# Patient Record
Sex: FEMALE | Race: WHITE
[De-identification: names, ages, dates, MRNs, and addresses within clinical notes are randomized per-mention and may not be internally consistent; named-entity substitution may affect disease eponyms.]

---

## 2017-03-17 ENCOUNTER — HOSPITAL ENCOUNTER (EMERGENCY)
Dept: HOSPITAL 10 - E/R | Age: 57
Discharge: LEFT BEFORE DISCHARGE | End: 2017-03-17
Payer: SELF-PAY

## 2017-03-17 VITALS — BODY MASS INDEX: 20.87 KG/M2 | WEIGHT: 90.17 LBS | HEIGHT: 55 IN

## 2017-03-17 DIAGNOSIS — Z53.21: Primary | ICD-10-CM

## 2017-07-09 ENCOUNTER — HOSPITAL ENCOUNTER (EMERGENCY)
Age: 57
Discharge: HOME | End: 2017-07-09

## 2017-07-09 DIAGNOSIS — R30.0: Primary | ICD-10-CM

## 2017-07-09 PROCEDURE — 81003 URINALYSIS AUTO W/O SCOPE: CPT

## 2017-07-12 ENCOUNTER — HOSPITAL ENCOUNTER (EMERGENCY)
Dept: HOSPITAL 10 - FTE | Age: 57
Discharge: HOME | End: 2017-07-12
Payer: COMMERCIAL

## 2017-07-12 VITALS — DIASTOLIC BLOOD PRESSURE: 64 MMHG | SYSTOLIC BLOOD PRESSURE: 118 MMHG | RESPIRATION RATE: 19 BRPM | HEART RATE: 75 BPM

## 2017-07-12 VITALS
WEIGHT: 94.8 LBS | BODY MASS INDEX: 21.94 KG/M2 | HEIGHT: 55 IN | HEIGHT: 55 IN | WEIGHT: 94.8 LBS | BODY MASS INDEX: 21.94 KG/M2

## 2017-07-12 DIAGNOSIS — N30.01: Primary | ICD-10-CM

## 2017-07-12 DIAGNOSIS — B37.3: ICD-10-CM

## 2017-07-12 PROCEDURE — 76856 US EXAM PELVIC COMPLETE: CPT

## 2017-07-12 PROCEDURE — 87086 URINE CULTURE/COLONY COUNT: CPT

## 2017-07-12 PROCEDURE — 96372 THER/PROPH/DIAG INJ SC/IM: CPT

## 2017-07-12 PROCEDURE — 76830 TRANSVAGINAL US NON-OB: CPT

## 2017-07-12 PROCEDURE — 81003 URINALYSIS AUTO W/O SCOPE: CPT

## 2017-07-12 NOTE — RADRPT
PROCEDURE:   Pelvic ultrasound.

 

CLINICAL INDICATION:  Pelvic pain

 

TECHNIQUE:   Gray scale, color doppler, spectral doppler ultrasound of the pelvis was performed with
 transabdominal and transvaginal transducers.

 

COMPARISON:   CT abdomen pelvis 02/11/2014 

 

FINDINGS:

 

Uterus: 

Position: Retroverted

Normal myometrial echogenicity.

Normal appearance of the endometrium. 

 

Ovaries: Not identified by the sonographer

 

Free fluid:  None.

 

Measurements:

Endometrium: 0.37 cm

Uterus: 6.3 x 2.2 x 3.4 cm

 

 

IMPRESSION:

 

Normal appearance of the uterus and endometrium.

Ovaries not identified by the sonographer.     

 

 

RPTAT: AADD

_____________________________________________ 

.Partha Thomas MD, MD           Date    Time 

Electronically viewed and signed by .Partha Thomas MD, MD on 07/12/2017 09:30 

 

D:  07/12/2017 09:30  T:  07/12/2017 09:30

.B/

## 2017-07-12 NOTE — ERD
ER Documentation


Chief Complaint


Date/Time


DATE: 7/12/17 


TIME: 10:15


Chief Complaint


PELVIC PAIN , PAINFUL URINATION





HPI


57-year-old female complaining of vaginal pain, pelvic pain and dysuria times 

several days.  Patient was seen here on 7/8/2017, was diagnosed with UTI.  She 

was given Keflex prescription.  Patient stated that she felt nausea and 

vomiting after taking the Keflex.  She stopped taking them.  She vomited twice 

this morning.  Able to maintain fluid intake.  Patient reports vaginal burning 

but no itching.  Denies vaginal discharge.  Denies fever or chills.  Denies 

flank pain.





ROS


All systems reviewed and are negative except as per history of present illness.





Medications


Home Meds


Active Scripts


Nitrofurantoin Monohyd Macrocr* (Macrobid*) 100 Mg Capsr, 100 MG PO BID for 7 

Days, CAP


   Prov:RUSH MCNAMARA NP         7/12/17


Acetaminophen* (Tylophen*) 500 Mg Capsule, 1 CAP PO Q6H Y for PAIN AND OR 

ELEVATED TEMP, #20 CAP


   Prov:SUSAN PLUMMER PA-C         7/9/17


Cephalexin* (Keflex*) 500 Mg Capsule, 500 MG PO BID for 7 Days, CAP


   Prov:SUSAN PLUMMER-C         7/9/17


Reported Medications


Famotidine* (Acid Reducer*) 20 Mg Tablet


   4/14/13





Allergies


Allergies:  


Coded Allergies:  


     ciprofloxacin (Verified  Allergy, Intermediate, rashes, 1/27/13)


     morphine (Verified  Adverse Reaction, DIARRHEA,VOMITING, 1/27/13)





PMhx/Soc


History of Surgery:  Yes (APPENDECTOMY)


Anesthesia Reaction:  No


Hx Neurological Disorder:  No


Hx Respiratory Disorders:  No


Hx Cardiac Disorders:  No


Hx Psychiatric Problems:  No


Hx Miscellaneous Medical Probl:  No


Hx Alcohol Use:  No


Hx Substance Use:  No


Hx Tobacco Use:  No





Physical Exam


Vitals





Vital Signs








  Date Time  Temp Pulse Resp B/P Pulse Ox O2 Delivery O2 Flow Rate FiO2


 


7/12/17 08:12 98.8 86 18 120/60 99   








Physical Exam


General:    Well-developed, well-nourished, conscious and coherent, in no 

distress


Skin:    Warm and dry without rash, good texture and turgor


Head:    Normocephalic without evidence of trauma


Eyes:    Sclera and conjunctivae normal; pupils equal, round, and reactive to 

light; extraocular movements are intact


Neck:    Supple without meningismus or adenopathy.  Carotids are equal.  

Trachea midline.  No bruits or JVD


Chest:    Normal AP diameter.  Good expansion without retractions.  Nontender.  

Lungs are clear to auscultate bilaterally with good tidal volume


Heart:    Regular rate and rhythm.  No murmur, rub, or gallops heard


Abdomen:    Soft and nontender without masses, guarding, or rebound.  Bowel 

sounds are active.  No hepatosplenomegaly


Back:    Without spinal or CVA tenderness


Pelvis:     Suprapubic tenderness with mild left pelvic tenderness.


:   External genitalia without lesions or masses.  Large amount of thick, 

white discharge noted in the vaginal canal.  Cervix normal, no cervical motion 

tenderness noted.  Uterus nontender and normal size.  No adnexal tenderness or 

masses noted.


Extremities:    Full range of motion.  Good strength bilaterally.  No clubbing, 

cyanosis, or edema. Peripheral pulses are intact. Sensation intact


Neuro:    Alert and oriented 4, GCS 15.  Cranial nerves grossly intact.  Motor 

and sensory exams nonfocal.  Moves all extremities.  Speech clear.  Gait normal


Results 24 hrs





 Laboratory Tests








Test


  7/12/17


08:50


 


Bedside Urine pH (LAB) 7.0 


 


Bedside Urine Protein (LAB) Negative 


 


Bedside Urine Glucose (UA) Negative 


 


Bedside Urine Ketones (LAB) Negative 


 


Bedside Urine Blood Negative 


 


Bedside Urine Nitrite (LAB) Negative 


 


Bedside Urine Leukocyte


Esterase (L 2+ 


 








 Current Medications








 Medications


  (Trade)  Dose


 Ordered  Sig/Hair


 Route


 PRN Reason  Start Time


 Stop Time Status Last Admin


Dose Admin


 


 Ceftriaxone Sodium


  (Rocephin)  500 mg  ONCE  ONCE


 IM


   7/12/17 09:00


 7/12/17 09:01 DC 7/12/17 08:57


 


 


 Lidocaine


  (Xylocaine 1%


  (Mdv) 20 ml)  1 ml  ONCE  ONCE


 IM


   7/12/17 09:00


 7/12/17 09:01 DC 7/12/17 08:59


 


 


 Fluconazole


  (Diflucan)  150 mg  ONCE  ONCE


 PO


   7/12/17 10:00


 7/12/17 10:01 DC  


 





PROCEDURE:   Pelvic ultrasound.


 


CLINICAL INDICATION:  Pelvic pain


 


TECHNIQUE:   Gray scale, color doppler, spectral doppler ultrasound of the 

pelvis was performed with transabdominal and transvaginal transducers.


 


COMPARISON:   CT abdomen pelvis 02/11/2014 


 


FINDINGS:


 


Uterus: 


Position: Retroverted


Normal myometrial echogenicity.


Normal appearance of the endometrium. 


 


Ovaries: Not identified by the sonographer


 


Free fluid:  None.


 


Measurements:


Endometrium: 0.37 cm


Uterus: 6.3 x 2.2 x 3.4 cm


 


 


IMPRESSION:


 


Normal appearance of the uterus and endometrium.


Ovaries not identified by the sonographer.     


 


 


RPTAT: AADD


_____________________________________________ 


.Partha Thomas MD, MD           Date    Time 


Electronically viewed and signed by .Partha Thomas MD, MD on 07/12/2017 

09:30 


 


D:  07/12/2017 09:30  T:  07/12/2017 09:30


.B/





CC: RUSH MCNAMARA. NP





Procedures/MDM


Well-appearing 57-year-old female presented ED with UTI symptoms.  Patient did 

not take the prescribed antibiotics.  Urine dip today again is positive for 

leukocyte.  Urine sent out for culture and sensitivity.  She is afebrile, no 

CVA tenderness.  I doubt she has pyelonephritis.  Rocephin 500 mg IM given to 

the patient in the ED.  Patient also will be given prescription of Macrobid.





Pelvic exam showed large amount of thick white vaginal discharge.  Patient also 

reports vaginal pain and burning.  I suspect that she has yeast vaginitis.  

Diflucan 150 mg p.o. given to the patient in the ED. Pelvic ultrasound is 

unremarkable.  I doubt pelvic inflammatory disease, ovarian torsion, ruptured 

ovarian cyst, ectopic pregnancy.





Patient appears well, stable for discharge and outpatient management. Medical 

decision making shared with patient and family. Education provided to patient 

and family. Patient and family expressed understanding of the plan.





Medications on discharge: Macrobid.


Follow-up: Primary care provider in 2-3 days or return to ED if worse.





Departure


Diagnosis:  


 Primary Impression:  


 UTI (urinary tract infection)


 Urinary tract infection type:  acute cystitis  Hematuria presence:  with 

hematuria  Qualified Code:  N30.01 - Acute cystitis with hematuria


 Additional Impression:  


 Yeast infection of the vagina


Condition:  Good


Patient Instructions:  Understanding Urinary Tract Infections (UTIs), Vaginal 

Infection: Yeast (Candidiasis)





Additional Instructions:  


Llame al doctor MAANA y linette nickolas ALANNAH PARA DENTRO DE 2-3 KENYON.Dgale a la 

secretaria que nosotros le instruimos hacer esta alannah.Avise o llame si jeronimo 

condicin se empeora antes de la alannah. Regresa aqui si peor o no mejor.











RUSH MCNAMARA. SANDRA Jul 12, 2017 10:23

## 2017-10-05 ENCOUNTER — HOSPITAL ENCOUNTER (EMERGENCY)
Dept: HOSPITAL 10 - FTE | Age: 57
Discharge: HOME | End: 2017-10-05
Payer: COMMERCIAL

## 2017-10-05 VITALS
HEIGHT: 57 IN | WEIGHT: 97 LBS | BODY MASS INDEX: 20.93 KG/M2 | WEIGHT: 97 LBS | BODY MASS INDEX: 20.93 KG/M2 | HEIGHT: 57 IN

## 2017-10-05 DIAGNOSIS — M79.89: Primary | ICD-10-CM

## 2017-10-05 PROCEDURE — 73130 X-RAY EXAM OF HAND: CPT

## 2017-10-05 NOTE — ERD
ER Documentation


Chief Complaint


Date/Time


DATE: 10/5/17 


TIME: 08:42


Chief Complaint


r. 1st finger pain/swelling x yesterday





HPI


This is a 57-year-old female who presents to the emergency room for evaluation 

of right thumb pain and swelling for the past day.  The patient states that she 

denies any trauma and denies any fevers associated with this.  She denies 

having any medical problems and has not taken any medication to help with her 

pain and symptoms and came to the emergency room for further evaluation.  The 

patient denies any numbness or tingling to the area





ROS


All systems reviewed and are negative except as per history of present illness.





Medications


Home Meds


Active Scripts


Phenazopyridine Hcl* (Pyridium*) 200 Mg Tab, 200 MG PO TID Y for URINARY PAIN, #

6 TAB


   Prov:ROSITA HENDRICKSON PA-C         6/20/16


Nitrofurantoin Monohyd Macrocr* (Macrobid*) 100 Mg Capsr, 100 MG PO BID for 7 

Days, CAP


   Prov:ROSITA HENDRICKSON PA-C         6/20/16


Cyclobenzaprine Hcl* (Cyclobenzaprine Hcl*) 10 Mg Tablet, 10 MG PO TID, #15 TAB


   Prov:RUSH MCNAMARA. NP         9/18/15


Ibuprofen* (Motrin*) 600 Mg Tab, 600 MG PO Q6H Y for PAIN AND OR ELEVATED TEMP, 

#30


   Prov:RUSH MCNAMARA. NP         9/18/15





Allergies


Allergies:  


Coded Allergies:  


     ciprofloxacin (Verified  Allergy, Unknown, 10/5/17)


     morphine (Verified  Allergy, Unknown, 10/5/17)





PMhx/Soc


Medical and Surgical Hx:  pt denies Surgical Hx


History of Surgery:  Yes (cholecystectomy )


Anesthesia Reaction:  No


Hx Neurological Disorder:  No


Hx Respiratory Disorders:  No


Hx Cardiac Disorders:  No


Hx Psychiatric Problems:  No


Hx Miscellaneous Medical Probl:  No


Hx Alcohol Use:  No


Hx Substance Use:  No


Hx Tobacco Use:  No


Smoking Status:  Never smoker





Physical Exam


Vitals





Vital Signs








  Date Time  Temp Pulse Resp B/P Pulse Ox O2 Delivery O2 Flow Rate FiO2


 


10/5/17 07:46 98.6 72 18 134/62 99   








Physical Exam


INITIAL VITAL SIGNS: Reviewed by me


GENERAL:  The patient is well developed and appropriate for usual state of 

health in no apparent distress


HEENT: Pupils equal, round, and reactive to light.  EOMI. There is no scleral 

icterus.


NECK:  C-spine is soft and supple, there is no meningismus.  There is no 

cervical lymphadenopathy.


LUNGS:  Clear to auscultation bilaterally. There are no rales, wheezes or 

rhonchi.


HEART:  Regular rate and rhythm, no murmurs, clicks, rubs or gallops.


ABDOMEN:  Soft, non-tender, non-distended.  There are bowel sounds in all four 

quadrants. No rebound or guarding.


EXTREMITIES:  There is no peripheral cyanosis or edema.  No focal swelling or 

erythema.  No swelling noted, no erythema, no skin sloughing


NEUROLOGICAL:  The patient moves all four extremities with 5/5 strength.  

Cranial nerves II - XII are intact. Normal gait. Alert and oriented


SKIN:  There is no apparent rash or petechiae.


HEME/LYMPHATIC:  There is no evidence of excessive bruising or lymphedema.


PSYCHIATRIC:  The patient does not appear anxious or depressed.


Results 24 hrs





 Current Medications








 Medications


  (Trade)  Dose


 Ordered  Sig/Hair


 Route


 PRN Reason  Start Time


 Stop Time Status Last Admin


Dose Admin


 


 Ibuprofen


  (Motrin)  800 mg  ONCE  ONCE


 PO


   10/5/17 08:30


 10/5/17 08:31 DC 10/5/17 08:06


 











Procedures/MDM


X-ray Hand 3V interpreted by me: 


Scaphoid:   [Normal]


Bones:    Degenerative changes


Joints:       [No dislocation]


Foreign body:    [None]





This 57-year-old female presents to the emergency room for evaluation of pain 

in her right thumb.  When I evaluated this patient did not note any swelling or 

erythema.  She did have what appeared to be arthritic nodules in the 

interphalangeal joints.  There was no fever and x-ray does reveal degenerative 

changes.  The patient was given Motrin in the emergency room and will be 

discharged home with a prescription for Motrin to take over the course of the 

next 7 days.  This is likely inflammatory related and not infectious that she 

has no erythema





Departure


Diagnosis:  


 Primary Impression:  


 Soft tissue swelling


Condition:  Stable











ZAID SCHULTZ DO Oct 5, 2017 08:43

## 2017-10-05 NOTE — RADRPT
PROCEDURE:   XR Hand. 

 

CLINICAL INDICATION:   Right hand pain. 

 

TECHNIQUE:   Three views.  Frontal, lateral, and oblique images of the right hand were obtained. 

 

COMPARISON:   No prior studies are available for comparison. 

 

FINDINGS:

There is no fracture or dislocation.

The soft tissues are normal.

There are degenerative changes of the first interphalangeal joint with joint space narrowing and ost
eophytes. The articular surfaces are otherwise intact.

There is no lytic or blastic lesion.

There is no radiopaque foreign body. 

 

IMPRESSION:

1.  Degenerative changes of the first interphalangeal joint.

2.  Otherwise unremarkable images of the right hand. 

RPTAT: QQ

_____________________________________________ 

.Broderick Lee MD, MD           Date    Time 

Electronically viewed and signed by .Broderick Lee MD, MD on 10/05/2017 08:39 

 

D:  10/05/2017 08:39  T:  10/05/2017 08:39

.R/

## 2017-12-18 ENCOUNTER — HOSPITAL ENCOUNTER (EMERGENCY)
Dept: HOSPITAL 10 - FTE | Age: 57
Discharge: HOME | End: 2017-12-18
Payer: COMMERCIAL

## 2017-12-18 VITALS
HEIGHT: 60 IN | BODY MASS INDEX: 19.04 KG/M2 | WEIGHT: 97 LBS | BODY MASS INDEX: 19.04 KG/M2 | HEIGHT: 60 IN | WEIGHT: 97 LBS

## 2017-12-18 DIAGNOSIS — N39.0: Primary | ICD-10-CM

## 2017-12-18 LAB
ADD UMIC: YES
COLOR UR: YELLOW
GLUCOSE UR STRIP-MCNC: NEGATIVE MG/DL
KETONES UR STRIP.AUTO-MCNC: NEGATIVE MG/DL
NITRITE UR QL STRIP.AUTO: NEGATIVE MG/DL
RBC # UR AUTO: NEGATIVE MG/DL
UR ASCORBIC ACID: 40 MG/DL
UR BILIRUBIN (DIP): NEGATIVE MG/DL
UR CLARITY: CLEAR
UR PH (DIP): 7 (ref 5–9)
UR RBC: 0 /HPF (ref 0–5)
UR SPECIFIC GRAVITY (DIP): 1.02 (ref 1–1.03)
UR TOTAL PROTEIN (DIP): NEGATIVE MG/DL
UROBILINOGEN UR STRIP-ACNC: NEGATIVE MG/DL
WBC # UR STRIP: (no result) LEU/UL

## 2017-12-18 PROCEDURE — 81001 URINALYSIS AUTO W/SCOPE: CPT

## 2017-12-18 PROCEDURE — 99283 EMERGENCY DEPT VISIT LOW MDM: CPT

## 2017-12-18 NOTE — ERD
ER Documentation


Chief Complaint


Chief Complaint


PT with dysuria and Vaginal pain since this morning.





HPI


49-year-old female comes in with painful urination with associated burning or 

urgency frequency starting today.  He reports suprapubic pelvic pressure pain, 

but no vaginal bleeding, fevers, chills, nausea, vomiting or flank pain.  She 

denies hematuria.





ROS


All systems reviewed and are negative except as per history of present illness.





Medications


Home Meds


Active Scripts


Ibuprofen* (Motrin*) 600 Mg Tab, 600 MG PO Q6, #30 TAB


   Prov:CAROLYNN DEAN PA-C         12/18/17


Cephalexin* (Keflex*) 500 Mg Capsule, 500 MG PO TID for 7 Days, CAP


   Prov:CAROLYNN DEAN PA-C         12/18/17


Ibuprofen* (Motrin*) 800 Mg Tab, 800 MG PO Q6H Y for PAIN AND OR ELEVATED TEMP, 

#30 TAB


   Prov:ZAID SCHULTZ DO         10/5/17


Nitrofurantoin Monohyd Macrocr* (Macrobid*) 100 Mg Capsr, 100 MG PO BID for 7 

Days, CAP


   Prov:RUSH MCNAMARA NP         7/12/17


Acetaminophen* (Tylophen*) 500 Mg Capsule, 1 CAP PO Q6H Y for PAIN AND OR 

ELEVATED TEMP, #20 CAP


   Prov:SUSAN PLUMMER PA-C         7/9/17


Cephalexin* (Keflex*) 500 Mg Capsule, 500 MG PO BID for 7 Days, CAP


   Prov:SUSAN PLUMMER PA-C         7/9/17


Phenazopyridine Hcl* (Pyridium*) 200 Mg Tab, 200 MG PO TID Y for URINARY PAIN, #

6 TAB


   Prov:ROSITA HENDRICKSON PA-C         6/20/16


Nitrofurantoin Monohyd Macrocr* (Macrobid*) 100 Mg Capsr, 100 MG PO BID for 7 

Days, CAP


   Prov:ROSITA HENDRICKSON PA-C         6/20/16


Cyclobenzaprine Hcl* (Cyclobenzaprine Hcl*) 10 Mg Tablet, 10 MG PO TID, #15 TAB


   Prov:RUSH MCNAMARA NP         9/18/15


Ibuprofen* (Motrin*) 600 Mg Tab, 600 MG PO Q6H Y for PAIN AND OR ELEVATED TEMP, 

#30


   Prov:RUSH MCNAMARA NP         9/18/15


Reported Medications


Famotidine* (Acid Reducer*) 20 Mg Tablet


   4/14/13





Allergies


Allergies:  


Coded Allergies:  


     ciprofloxacin (Verified  Allergy, Intermediate, rashes, 1/27/13)


     morphine (Verified  Adverse Reaction, DIARRHEA,VOMITING, 1/27/13)





PMhx/Soc


History of Surgery:  Yes (cholecystectomy )


Anesthesia Reaction:  No


Hx Neurological Disorder:  No


Hx Respiratory Disorders:  No


Hx Cardiac Disorders:  No


Hx Psychiatric Problems:  No


Hx Miscellaneous Medical Probl:  No


Hx Alcohol Use:  No


Hx Substance Use:  No


Hx Tobacco Use:  No


Smoking Status:  Never smoker





Physical Exam


Vitals





Vital Signs








  Date Time  Temp Pulse Resp B/P Pulse Ox O2 Delivery O2 Flow Rate FiO2


 


12/18/17 11:43 98.3 104 18 134/69 97   








Physical Exam


General: Well-developed, well-nourished.  The patient appears in no acute 

distress.


HEENT: Head is normocephalic, atraumatic. No scleral icterus.  


Neck: Supple.  Nontender.


Lungs: Clear to auscultation.  Normal air movement.


Heart: Regular rate and rhythm.  S1 and S2 are normal.  No murmurs, gallops, or 

rubs.


Abdomen: Soft, nontender, nondistended.  Bowel sounds are normoactive.


Extremities: No clubbing or cyanosis.  Normal pulses. Moving extremities x 4. 

No weakness.


Neurologic: Alert and oriented 3.  No focal deficits.


Skin: Normal turgor.  No rash or lesions.


Results 24 hrs





 Laboratory Tests








Test


  12/18/17


14:07


 


Urine Color YELLOW 


 


Urine Clarity CLEAR 


 


Urine pH 7.0 


 


Urine Specific Gravity 1.016 


 


Urine Ketones NEGATIVEmg/dL 


 


Urine Nitrite NEGATIVEmg/dL 


 


Urine Bilirubin NEGATIVEmg/dL 


 


Urine Urobilinogen NEGATIVEmg/dL 


 


Urine Leukocyte Esterase 2+Shanique/ul 


 


Urine Microscopic RBC 0/HPF 


 


Urine Microscopic /HPF 


 


Urine Hemoglobin NEGATIVEmg/dL 


 


Urine Glucose NEGATIVEmg/dL 


 


Urine Total Protein NEGATIVEmg/dl 











Procedures/MDM


57-year-old female comes in with urinary tract infection, urine analysis shows 

about 100 white blood cells positive leukocyte esterase.  Her vitals reviewed, 

she is not showing signs of sepsis, pyelonephritis, acute or surgical abdominal 

process.





Departure


Diagnosis:  


 Primary Impression:  


 UTI (urinary tract infection)


Condition:  Good


Patient Instructions:  Understanding Urinary Tract Infections (UTIs)











CAROLYNN DEAN PA-C Dec 18, 2017 14:37

## 2018-01-16 ENCOUNTER — HOSPITAL ENCOUNTER (EMERGENCY)
Age: 58
Discharge: LEFT BEFORE DISCHARGE | End: 2018-01-16

## 2018-01-16 ENCOUNTER — HOSPITAL ENCOUNTER (EMERGENCY)
Dept: HOSPITAL 91 - E/R | Age: 58
Discharge: LEFT BEFORE DISCHARGE | End: 2018-01-16
Payer: SELF-PAY

## 2018-01-16 DIAGNOSIS — Z53.21: Primary | ICD-10-CM

## 2018-07-10 ENCOUNTER — HOSPITAL ENCOUNTER (EMERGENCY)
Age: 58
Discharge: HOME | End: 2018-07-10

## 2018-07-10 ENCOUNTER — HOSPITAL ENCOUNTER (EMERGENCY)
Dept: HOSPITAL 91 - FTE | Age: 58
Discharge: HOME | End: 2018-07-10
Payer: COMMERCIAL

## 2018-07-10 DIAGNOSIS — E87.6: Primary | ICD-10-CM

## 2018-07-10 DIAGNOSIS — R19.7: ICD-10-CM

## 2018-07-10 DIAGNOSIS — J32.9: ICD-10-CM

## 2018-07-10 DIAGNOSIS — N39.0: ICD-10-CM

## 2018-07-10 LAB
ADD MAN DIFF?: NO
ADD UMIC: YES
ALANINE AMINOTRANSFERASE: 13 IU/L (ref 13–69)
ALBUMIN/GLOBULIN RATIO: 1.4
ALBUMIN: 4.5 G/DL (ref 3.3–4.9)
ALKALINE PHOSPHATASE: 97 IU/L (ref 42–121)
AMYLASE: 69 U/L (ref 11–123)
ANION GAP: 11 (ref 8–16)
ASPARTATE AMINO TRANSFERASE: 24 IU/L (ref 15–46)
BASOPHIL #: 0 10^3/UL (ref 0–0.1)
BASOPHILS %: 1 % (ref 0–2)
BILIRUBIN,DIRECT: 0 MG/DL (ref 0–0.2)
BILIRUBIN,TOTAL: 0.5 MG/DL (ref 0.2–1.3)
BLOOD UREA NITROGEN: 6 MG/DL (ref 7–20)
CALCIUM: 9.8 MG/DL (ref 8.4–10.2)
CARBON DIOXIDE: 27 MMOL/L (ref 21–31)
CHLORIDE: 108 MMOL/L (ref 97–110)
CREATININE: 0.72 MG/DL (ref 0.44–1)
EOSINOPHILS #: 0 10^3/UL (ref 0–0.5)
EOSINOPHILS %: 0.3 % (ref 0–7)
GLOBULIN: 3.2 G/DL (ref 1.3–3.2)
GLUCOSE: 129 MG/DL (ref 70–220)
HEMATOCRIT: 38.9 % (ref 37–47)
HEMOGLOBIN: 13.1 G/DL (ref 12–16)
LIPASE: 150 U/L (ref 23–300)
LYMPHOCYTES #: 1 10^3/UL (ref 0.8–2.9)
LYMPHOCYTES %: 26 % (ref 15–51)
MEAN CORPUSCULAR HEMOGLOBIN: 30.5 PG (ref 29–33)
MEAN CORPUSCULAR HGB CONC: 33.7 G/DL (ref 32–37)
MEAN CORPUSCULAR VOLUME: 90.5 FL (ref 82–101)
MEAN PLATELET VOLUME: 9.6 FL (ref 7.4–10.4)
MONOCYTE #: 0.3 10^3/UL (ref 0.3–0.9)
MONOCYTES %: 7.1 % (ref 0–11)
NEUTROPHIL #: 2.6 10^3/UL (ref 1.6–7.5)
NEUTROPHILS %: 65.3 % (ref 39–77)
NUCLEATED RED BLOOD CELLS #: 0 10^3/UL (ref 0–0)
NUCLEATED RED BLOOD CELLS%: 0 /100WBC (ref 0–0)
PLATELET COUNT: 301 10^3/UL (ref 140–415)
POTASSIUM: 3.2 MMOL/L (ref 3.5–5.1)
RED BLOOD COUNT: 4.3 10^6/UL (ref 4.2–5.4)
RED CELL DISTRIBUTION WIDTH: 13 % (ref 11.5–14.5)
SODIUM: 143 MMOL/L (ref 135–144)
TOTAL PROTEIN: 7.7 G/DL (ref 6.1–8.1)
UR ASCORBIC ACID: NEGATIVE MG/DL
UR BACTERIA: (no result) /HPF
UR BILIRUBIN (DIP): NEGATIVE MG/DL
UR BLOOD (DIP): NEGATIVE MG/DL
UR CLARITY: (no result)
UR COLOR: YELLOW
UR GLUCOSE (DIP): NEGATIVE MG/DL
UR KETONES (DIP): NEGATIVE MG/DL
UR LEUKOCYTE ESTERASE (DIP): (no result) LEU/UL
UR NITRITE (DIP): NEGATIVE MG/DL
UR PH (DIP): 6 (ref 5–9)
UR RBC: 4 /HPF (ref 0–5)
UR SPECIFIC GRAVITY (DIP): 1 (ref 1–1.03)
UR TOTAL PROTEIN (DIP): NEGATIVE MG/DL
UR UROBILINOGEN (DIP): NEGATIVE MG/DL
UR WBC: 3 /HPF (ref 0–5)
WHITE BLOOD COUNT: 3.9 10^3/UL (ref 4.8–10.8)

## 2018-07-10 PROCEDURE — 85025 COMPLETE CBC W/AUTO DIFF WBC: CPT

## 2018-07-10 PROCEDURE — 80053 COMPREHEN METABOLIC PANEL: CPT

## 2018-07-10 PROCEDURE — 87086 URINE CULTURE/COLONY COUNT: CPT

## 2018-07-10 PROCEDURE — 82150 ASSAY OF AMYLASE: CPT

## 2018-07-10 PROCEDURE — 99283 EMERGENCY DEPT VISIT LOW MDM: CPT

## 2018-07-10 PROCEDURE — 82962 GLUCOSE BLOOD TEST: CPT

## 2018-07-10 PROCEDURE — 81001 URINALYSIS AUTO W/SCOPE: CPT

## 2018-07-10 PROCEDURE — 83690 ASSAY OF LIPASE: CPT

## 2018-07-10 RX ADMIN — POTASSIUM CHLORIDE 1 MEQ: 1500 TABLET, EXTENDED RELEASE ORAL at 11:50

## 2018-12-10 ENCOUNTER — HOSPITAL ENCOUNTER (EMERGENCY)
Age: 58
Discharge: HOME | End: 2018-12-10

## 2018-12-10 ENCOUNTER — HOSPITAL ENCOUNTER (EMERGENCY)
Dept: HOSPITAL 91 - E/R | Age: 58
Discharge: HOME | End: 2018-12-10
Payer: COMMERCIAL

## 2018-12-10 DIAGNOSIS — N30.90: Primary | ICD-10-CM

## 2018-12-10 LAB
URINE LEUKOCYTE EST (DIP) POC: (no result)
URINE PH (DIP) POC: 6 (ref 5–8.5)

## 2018-12-10 PROCEDURE — 99283 EMERGENCY DEPT VISIT LOW MDM: CPT

## 2018-12-10 PROCEDURE — 81003 URINALYSIS AUTO W/O SCOPE: CPT

## 2018-12-10 RX ADMIN — ALUMINUM HYDROXIDE, MAGNESIUM HYDROXIDE, DIMETHICONE 1 ML: 200; 200; 20 SUSPENSION ORAL at 08:09

## 2018-12-10 RX ADMIN — ONDANSETRON 1 MG: 4 TABLET, ORALLY DISINTEGRATING ORAL at 08:09

## 2018-12-10 RX ADMIN — NITROFURANTOIN MONOHYDRATE/MACROCRYSTALLINE 1 MG: 25; 75 CAPSULE ORAL at 08:10

## 2018-12-10 RX ADMIN — LOPERAMIDE HYDROCHLORIDE 1 MG: 1 SOLUTION ORAL at 08:26

## 2018-12-14 ENCOUNTER — HOSPITAL ENCOUNTER (EMERGENCY)
Dept: HOSPITAL 91 - E/R | Age: 58
Discharge: HOME | End: 2018-12-14
Payer: COMMERCIAL

## 2018-12-14 ENCOUNTER — HOSPITAL ENCOUNTER (EMERGENCY)
Age: 58
Discharge: HOME | End: 2018-12-14

## 2018-12-14 DIAGNOSIS — N30.90: Primary | ICD-10-CM

## 2018-12-14 LAB
ADD MAN DIFF?: NO
ADD UMIC: YES
ALANINE AMINOTRANSFERASE: 46 IU/L (ref 13–69)
ALBUMIN/GLOBULIN RATIO: 1.36
ALBUMIN: 4.1 G/DL (ref 3.3–4.9)
ALKALINE PHOSPHATASE: 88 IU/L (ref 42–121)
ANION GAP: 8 (ref 5–13)
ASPARTATE AMINO TRANSFERASE: 97 IU/L (ref 15–46)
BASOPHIL #: 0.1 10^3/UL (ref 0–0.1)
BASOPHILS %: 0.8 % (ref 0–2)
BILIRUBIN,DIRECT: 0 MG/DL (ref 0–0.2)
BILIRUBIN,TOTAL: 0.6 MG/DL (ref 0.2–1.3)
BLOOD UREA NITROGEN: 7 MG/DL (ref 7–20)
CALCIUM: 9.5 MG/DL (ref 8.4–10.2)
CARBON DIOXIDE: 29 MMOL/L (ref 21–31)
CHLORIDE: 104 MMOL/L (ref 97–110)
CREATININE: 0.68 MG/DL (ref 0.44–1)
EOSINOPHILS #: 0 10^3/UL (ref 0–0.5)
EOSINOPHILS %: 0.6 % (ref 0–7)
GLOBULIN: 3 G/DL (ref 1.3–3.2)
GLUCOSE: 108 MG/DL (ref 70–220)
HEMATOCRIT: 37.7 % (ref 37–47)
HEMOGLOBIN: 12.4 G/DL (ref 12–16)
IMMATURE GRANS #M: 0.01 10^3/UL (ref 0–0.03)
IMMATURE GRANS % (M): 0.2 % (ref 0–0.43)
LIPASE: 57 U/L (ref 23–300)
LYMPHOCYTES #: 2 10^3/UL (ref 0.8–2.9)
LYMPHOCYTES %: 31.4 % (ref 15–51)
MEAN CORPUSCULAR HEMOGLOBIN: 29.9 PG (ref 29–33)
MEAN CORPUSCULAR HGB CONC: 32.9 G/DL (ref 32–37)
MEAN CORPUSCULAR VOLUME: 90.8 FL (ref 82–101)
MEAN PLATELET VOLUME: 9.8 FL (ref 7.4–10.4)
MONOCYTE #: 0.4 10^3/UL (ref 0.3–0.9)
MONOCYTES %: 6.8 % (ref 0–11)
NEUTROPHIL #: 3.9 10^3/UL (ref 1.6–7.5)
NEUTROPHILS %: 60.2 % (ref 39–77)
NUCLEATED RED BLOOD CELLS #: 0 10^3/UL (ref 0–0)
NUCLEATED RED BLOOD CELLS%: 0 /100WBC (ref 0–0)
PLATELET COUNT: 261 10^3/UL (ref 140–415)
POTASSIUM: 3.5 MMOL/L (ref 3.5–5.1)
RED BLOOD COUNT: 4.15 10^6/UL (ref 4.2–5.4)
RED CELL DISTRIBUTION WIDTH: 13 % (ref 11.5–14.5)
SODIUM: 141 MMOL/L (ref 135–144)
TOTAL PROTEIN: 7.1 G/DL (ref 6.1–8.1)
UR ASCORBIC ACID: NEGATIVE MG/DL
UR BACTERIA: (no result) /HPF
UR BILIRUBIN (DIP): NEGATIVE MG/DL
UR BLOOD (DIP): (no result) MG/DL
UR CLARITY: (no result)
UR COLOR: YELLOW
UR GLUCOSE (DIP): NEGATIVE MG/DL
UR KETONES (DIP): NEGATIVE MG/DL
UR LEUKOCYTE ESTERASE (DIP): (no result) LEU/UL
UR NITRITE (DIP): NEGATIVE MG/DL
UR PH (DIP): 7 (ref 5–9)
UR RBC: 5 /HPF (ref 0–5)
UR SPECIFIC GRAVITY (DIP): 1 (ref 1–1.03)
UR SQUAMOUS EPITHELIAL CELL: (no result) /HPF
UR TOTAL PROTEIN (DIP): NEGATIVE MG/DL
UR UROBILINOGEN (DIP): NEGATIVE MG/DL
UR WBC: 87 /HPF (ref 0–5)
WHITE BLOOD COUNT: 6.4 10^3/UL (ref 4.8–10.8)

## 2018-12-14 PROCEDURE — 96374 THER/PROPH/DIAG INJ IV PUSH: CPT

## 2018-12-14 PROCEDURE — 83690 ASSAY OF LIPASE: CPT

## 2018-12-14 PROCEDURE — 80053 COMPREHEN METABOLIC PANEL: CPT

## 2018-12-14 PROCEDURE — 85025 COMPLETE CBC W/AUTO DIFF WBC: CPT

## 2018-12-14 PROCEDURE — 99284 EMERGENCY DEPT VISIT MOD MDM: CPT

## 2018-12-14 PROCEDURE — 81001 URINALYSIS AUTO W/SCOPE: CPT

## 2018-12-14 RX ADMIN — FENTANYL CITRATE 1 MCG: 50 INJECTION, SOLUTION INTRAMUSCULAR; INTRAVENOUS at 17:12

## 2019-02-05 ENCOUNTER — HOSPITAL ENCOUNTER (EMERGENCY)
Dept: HOSPITAL 91 - FTE | Age: 59
Discharge: HOME | End: 2019-02-05
Payer: COMMERCIAL

## 2019-02-05 DIAGNOSIS — N39.0: Primary | ICD-10-CM

## 2019-02-05 LAB
URINE LEUKOCYTE EST (DIP) POC: (no result)
URINE PH (DIP) POC: 7 (ref 5–8.5)

## 2019-02-05 PROCEDURE — 81003 URINALYSIS AUTO W/O SCOPE: CPT

## 2019-02-05 PROCEDURE — 87086 URINE CULTURE/COLONY COUNT: CPT

## 2019-02-05 PROCEDURE — 99283 EMERGENCY DEPT VISIT LOW MDM: CPT

## 2019-04-05 ENCOUNTER — HOSPITAL ENCOUNTER (EMERGENCY)
Dept: HOSPITAL 91 - FTE | Age: 59
Discharge: HOME | End: 2019-04-05
Payer: COMMERCIAL

## 2019-04-05 ENCOUNTER — HOSPITAL ENCOUNTER (EMERGENCY)
Dept: HOSPITAL 10 - FTE | Age: 59
Discharge: HOME | End: 2019-04-05
Payer: COMMERCIAL

## 2019-04-05 VITALS — DIASTOLIC BLOOD PRESSURE: 79 MMHG | RESPIRATION RATE: 18 BRPM | SYSTOLIC BLOOD PRESSURE: 136 MMHG | HEART RATE: 61 BPM

## 2019-04-05 VITALS
WEIGHT: 84.88 LBS | HEIGHT: 55 IN | BODY MASS INDEX: 19.64 KG/M2 | HEIGHT: 55 IN | BODY MASS INDEX: 19.64 KG/M2 | WEIGHT: 84.88 LBS

## 2019-04-05 DIAGNOSIS — N39.0: Primary | ICD-10-CM

## 2019-04-05 LAB
ADD UMIC: YES
UR ASCORBIC ACID: NEGATIVE MG/DL
UR BACTERIA: (no result) /HPF
UR BILIRUBIN (DIP): NEGATIVE MG/DL
UR BLOOD (DIP): NEGATIVE MG/DL
UR CLARITY: CLEAR
UR COLOR: (no result)
UR GLUCOSE (DIP): NEGATIVE MG/DL
UR KETONES (DIP): NEGATIVE MG/DL
UR LEUKOCYTE ESTERASE (DIP): (no result) LEU/UL
UR NITRITE (DIP): NEGATIVE MG/DL
UR PH (DIP): 8 (ref 5–9)
UR RBC: 0 /HPF (ref 0–5)
UR SPECIFIC GRAVITY (DIP): 1 (ref 1–1.03)
UR TOTAL PROTEIN (DIP): NEGATIVE MG/DL
UR UROBILINOGEN (DIP): NEGATIVE MG/DL
UR WBC: 9 /HPF (ref 0–5)

## 2019-04-05 PROCEDURE — 81001 URINALYSIS AUTO W/SCOPE: CPT

## 2019-04-05 PROCEDURE — 99284 EMERGENCY DEPT VISIT MOD MDM: CPT

## 2019-04-05 RX ADMIN — ACETAMINOPHEN 1 MG: 500 TABLET, FILM COATED ORAL at 14:37

## 2019-04-05 NOTE — ERD
ER Documentation


Chief Complaint


Chief Complaint





body aches, nasal congestion and sneezing x3 days





HPI


59-year-old female presents with burning in the pelvic area for the last 16 


years.  She is seen a gynecologist and told her that was normal as well as her 


primary doctor.  She has been treated on occasion with cream and antibiotics 


without relief.  She denies any discharge, bleeding, right or left abdominal 


pain, fevers, vomiting, shortness of breath, chest pain.





ROS


All systems reviewed and are negative except as per history of present illness.





Medications


Home Meds


Active Scripts


Nitrofurantoin Monohyd Macrocr* (Macrobid*) 100 Mg Capsr, 100 MG PO BID for 7 


Days, CAP


   Prov:DURGA JACQUES MD         4/5/19


Phenazopyridine Hcl* (Pyridium*) 200 Mg Tab, 200 MG PO TID PRN for URINARY PAIN,


#6 TAB


   Prov:DURGA JACQUES MD         4/5/19


Acetaminophen* (Tylophen*) 500 Mg Capsule, 1 CAP PO Q6H PRN for PAIN AND OR 


ELEVATED TEMP, #20 CAP


   Prov:DURGA JACQUES MD         4/5/19


Cephalexin* (Keflex*) 500 Mg Capsule, 500 MG PO QID for 7 Days, CAP


   Prov:MARIO WATTS PA-C         2/5/19


Cephalexin* (Keflex*) 500 Mg Capsule, 500 MG PO BID for 7 Days, CAP


   Prov:GEORGES VILLEGAS MD         12/14/18


Ondansetron (Ondansetron Odt) 4 Mg Tab.rapdis, 4 MG PO Q6H PRN for NAUSEA AND/OR


VOMITING, #10 TAB


   Prov:DANIA OLIVERA MD         12/10/18


Loperamide Hcl* (Imodium*) 2 Mg Capsule, 2 MG PO .AFTER EA LOOSE BM PRN for 


DIARRHEA, #10 TAB


   Prov:DANIA OLIVERA MD         12/10/18


Nitrofurantoin Monohyd Macrocr* (Macrobid*) 100 Mg Capsr, 100 MG PO BID for 7 


Days, CAP


   Prov:DANIA OLIVERA MD         12/10/18


Loperamide Hcl* (Imodium*) 2 Mg Capsule, 2 MG PO .AFTER EA LOOSE BM PRN for 


DIARRHEA, #10 TAB


   Prov:RUIZ BARNARD         7/10/18


Acetaminophen* (Tylophen*) 500 Mg Capsule, 1 CAP PO Q6H PRN for PAIN AND OR 


ELEVATED TEMP, #20 CAP


   Prov:RUIZ BARNARD         7/10/18


Amoxicillin/Potassium Clav (Amox-Clav 875-125 mg Tablet) 875-125 mg Tab, 1 TAB 


PO BID for 10 Days, #20 TAB


   Prov:RUIZ BARNARD         7/10/18


Ibuprofen* (Motrin*) 600 Mg Tab, 600 MG PO Q6, #30 TAB


   Prov:CAROLYNN DEAN PA-C         12/18/17


Cephalexin* (Keflex*) 500 Mg Capsule, 500 MG PO TID for 7 Days, CAP


   Prov:CAROLYNN DEAN PA-C         12/18/17


Ibuprofen* (Motrin*) 800 Mg Tab, 800 MG PO Q6H PRN for PAIN AND OR ELEVATED 


TEMP, #30 TAB


   Prov:ZAID SCHULTZ DO         10/5/17


Nitrofurantoin Monohyd Macrocr* (Macrobid*) 100 Mg Capsr, 100 MG PO BID for 7 


Days, CAP


   Prov:RUSH MCNAMARA NP         7/12/17


Acetaminophen* (Tylophen*) 500 Mg Capsule, 1 CAP PO Q6H PRN for PAIN AND OR 


ELEVATED TEMP, #20 CAP


   Prov:SUSAN PLUMMER PA-C         7/9/17


Cephalexin* (Keflex*) 500 Mg Capsule, 500 MG PO BID for 7 Days, CAP


   Prov:SUSAN PLUMMER PA-C         7/9/17


Phenazopyridine Hcl* (Pyridium*) 200 Mg Tab, 200 MG PO TID PRN for URINARY PAIN,


#6 TAB


   Prov:ROSITA HENDRICKSON PA-C         6/20/16


Nitrofurantoin Monohyd Macrocr* (Macrobid*) 100 Mg Capsr, 100 MG PO BID for 7 


Days, CAP


   Prov:ROSITA HENDRICKSON PA-C         6/20/16


Cyclobenzaprine Hcl* (Cyclobenzaprine Hcl*) 10 Mg Tablet, 10 MG PO TID, #15 TAB


   Prov:RUSH MCNAMARA. NP         9/18/15


Ibuprofen* (Motrin*) 600 Mg Tab, 600 MG PO Q6H PRN for PAIN AND OR ELEVATED 


TEMP, #30


   Prov:RUSH MCNAMARA NP         9/18/15


Reported Medications


Famotidine* (Acid Reducer*) 20 Mg Tablet


   4/14/13





Allergies


Allergies:  


Coded Allergies:  


     ciprofloxacin (Verified  Allergy, Intermediate, rashes, 7/10/18)


     morphine (Verified  Adverse Reaction, Unknown, DIARRHEA,VOMITING, 7/10/18)





PMhx/Soc


Medical and Surgical Hx:  pt denies Medical Hx


History of Surgery:  Yes (c section,cholecystectomy)


Anesthesia Reaction:  No


Hx Neurological Disorder:  No


Hx Respiratory Disorders:  No


Hx Cardiac Disorders:  No


Hx Psychiatric Problems:  No


Hx Miscellaneous Medical Probl:  Yes (UTI)


Hx Alcohol Use:  No


Hx Substance Use:  No


Hx Tobacco Use:  No


Smoking Status:  Never smoker





FmHx


Family History:  No diabetes, No coronary disease, No other





Physical Exam


Vitals





Vital Signs


  Date      Temp  Pulse  Resp  B/P (MAP)   Pulse Ox  O2          O2 Flow    FiO2


Time                                                 Delivery    Rate


    4/5/19  98.7     61    18      136/79        98


     12:57                           (98)





Physical Exam


Const:   No acute distress


Head:   Atraumatic 


Eyes:    Normal Conjunctiva


ENT:    Normal External Ears, Nose and Mouth.


Neck:               Full range of motion. No meningismus.


Resp:   Clear to auscultation bilaterally


Cardio:   Regular rate and rhythm, no murmurs


Abd:    Soft, non tender, non distended. Normal bowel sounds.  Pelvic 


examination with chaperone shows no no masses, external lesions, erythema, 


identifiable abnormalities.  No cervical motion tenderness.


Skin:   No petechiae or rashes


Back:   No midline or flank tenderness


Ext:    No cyanosis, or edema


Neur:   Awake and alert


Psych:    Normal Mood and Affect


Results 24 hrs





Laboratory Tests


                   Test
                        4/5/19
14:10


                   Urine Color               KAUR


                   Urine Clarity             CLEAR


                   Urine pH                             8.0


                   Urine Specific Gravity             1.003


                   Urine Ketones             NEGATIVE mg/dL


                   Urine Nitrite             NEGATIVE mg/dL


                   Urine Bilirubin           NEGATIVE mg/dL


                   Urine Urobilinogen        NEGATIVE mg/dL


                   Urine Leukocyte Esterase       1+ Shanique/ul


                   Urine Microscopic RBC             0 /HPF


                   Urine Microscopic WBC             9 /HPF


                   Urine Bacteria            FEW /HPF


                   Urine Hemoglobin          NEGATIVE mg/dL


                   Urine Glucose             NEGATIVE mg/dL


                   Urine Total Protein       NEGATIVE mg/dl





Current Medications


 Medications
   Dose
          Sig/Hair
       Start Time
   Status  Last


 (Trade)       Ordered        Route
 PRN     Stop Time              Admin
Dose


                              Reason                                Admin


                500 mg         ONCE  STAT
    4/5/19        DC            4/5/19


Acetaminophen                 PO
            14:33
 4/5/19                14:37




  (Tylenol                                  14:34


Tab)








Procedures/MDM


Urine shows leukocyte esterase and white blood cells.  Patient will be treated 


for UTI with Macrobid and Tylenol and Pyridium although patient's symptoms have 


been for 16 years with normal evaluations by OB/GYN.  She will be discharged 


home with primary care follow-up and continued observation and return 


precautions.  The patient was stable with no new complaints during the ER 


course. Clinically, there is no current evidence to suggest meningitis, sepsis, 


acute abdomen, pneumonia, stroke,  acute coronary syndrome, pulmonary embolism, 


aortic dissection or any other emergent condition appearing to require further 


evaluation or hospitalization.  Patient counseled regarding my diagnostic 


impression and care plan. Prior to discharge all questions answered. Pt agrees 


with treatment plan and understands strict return precautions. Pt is instructed 


to follow up with primary care provider within 24-48 hours. Precautionary 


instructions provided including instructions to return to the ER if not impr


oving or for any worsening or changing symptoms or concerns.





Departure


Diagnosis:  


   Primary Impression:  


   UTI (urinary tract infection)


   Urinary tract infection type:  acute cystitis  Hematuria presence:  without 


   hematuria  Qualified Codes:  N30.00 - Acute cystitis without hematuria


Condition:  Stable


Patient Instructions:  Understanding Urinary Tract Infections (UTIs), Pelvic 


Pain, Unknown Cause


Referrals:  


ALEX REYES (PCP)





Additional Instructions:  


HAY INFECCION EN ORINA JJ Y VAMOS A TRATAR JJ OTRO examines normal hoy. 


Cheque otro vez con jeronimo doctor primario en el proximo nunez or regresa para mas o 


nueva simptomas.











DURGA JACQUES MD              Apr 5, 2019 14:50

## 2019-04-26 ENCOUNTER — HOSPITAL ENCOUNTER (EMERGENCY)
Dept: HOSPITAL 10 - E/R | Age: 59
Discharge: HOME | End: 2019-04-26
Payer: COMMERCIAL

## 2019-04-26 ENCOUNTER — HOSPITAL ENCOUNTER (EMERGENCY)
Dept: HOSPITAL 91 - E/R | Age: 59
Discharge: HOME | End: 2019-04-26
Payer: COMMERCIAL

## 2019-04-26 VITALS — WEIGHT: 84.44 LBS | BODY MASS INDEX: 19.54 KG/M2 | HEIGHT: 55 IN

## 2019-04-26 VITALS — HEART RATE: 63 BPM | SYSTOLIC BLOOD PRESSURE: 149 MMHG | RESPIRATION RATE: 18 BRPM | DIASTOLIC BLOOD PRESSURE: 66 MMHG

## 2019-04-26 DIAGNOSIS — R40.2362: ICD-10-CM

## 2019-04-26 DIAGNOSIS — R40.2142: ICD-10-CM

## 2019-04-26 DIAGNOSIS — N30.90: Primary | ICD-10-CM

## 2019-04-26 DIAGNOSIS — R40.2252: ICD-10-CM

## 2019-04-26 LAB
URINE LEUKOCYTE EST (DIP) POC: (no result)
URINE PH (DIP) POC: 6.5 (ref 5–8.5)

## 2019-04-26 PROCEDURE — 99284 EMERGENCY DEPT VISIT MOD MDM: CPT

## 2019-04-26 PROCEDURE — 96372 THER/PROPH/DIAG INJ SC/IM: CPT

## 2019-04-26 PROCEDURE — 81003 URINALYSIS AUTO W/O SCOPE: CPT

## 2019-04-26 RX ADMIN — CEPHALEXIN 1 MG: 500 CAPSULE ORAL at 07:04

## 2019-04-26 RX ADMIN — KETOROLAC TROMETHAMINE 1 MG: 30 INJECTION, SOLUTION INTRAMUSCULAR at 07:03

## 2019-04-26 RX ADMIN — PHENAZOPYRIDINE HYDROCHLORIDE 1 MG: 100 TABLET ORAL at 07:03

## 2019-04-26 NOTE — ERD
ER Documentation


Chief Complaint


Chief Complaint





AP, DIARRHEA X'S 1 DAY





HPI


Patient is a 59-year-old female with no medical problems who presents with 


abdominal pain.  She said her symptoms started yesterday at 6 PM.  She has had 


diarrhea.  She has no fevers.  She tried Tylenol and Pepto-Bismol.  She has no 


pain with urination.  Upon review of old medical record the patient has multiple


visits to the ER.  Review of the emergency department information exchange 


system shows visits to 3 separate emergency departments for a total of 9 visits 


over the past 1 year.  Her primary doctor is Dr. Reyes.





ROS


All systems reviewed and are negative except as per history of present illness.





Medications


Home Meds


Active Scripts


Phenazopyridine Hcl* (Pyridium*) 200 Mg Tab, 200 MG PO TID PRN for URINARY PAIN,


#6 TAB


   Prov:DANIA OLIVERA MD         4/26/19


Cephalexin* (Keflex*) 500 Mg Capsule, 500 MG PO BID for 14 Days, CAP


   Prov:DANIA OLIVERA MD         4/26/19


Nitrofurantoin Monohyd Macrocr* (Macrobid*) 100 Mg Capsr, 100 MG PO BID for 7 


Days, CAP


   Prov:DURGA JACQUES MD         4/5/19


Phenazopyridine Hcl* (Pyridium*) 200 Mg Tab, 200 MG PO TID PRN for URINARY PAIN,


#6 TAB


   Prov:DURGA JACQUES MD         4/5/19


Acetaminophen* (Tylophen*) 500 Mg Capsule, 1 CAP PO Q6H PRN for PAIN AND OR 


ELEVATED TEMP, #20 CAP


   Prov:DURGA JACQUES MD         4/5/19


Cephalexin* (Keflex*) 500 Mg Capsule, 500 MG PO QID for 7 Days, CAP


   Prov:MARIO WATTS PA-C         2/5/19


Cephalexin* (Keflex*) 500 Mg Capsule, 500 MG PO BID for 7 Days, CAP


   Prov:GEORGES VILLEGAS MD         12/14/18


Ondansetron (Ondansetron Odt) 4 Mg Tab.rapdis, 4 MG PO Q6H PRN for NAUSEA AND/OR


VOMITING, #10 TAB


   Prov:DANIA OLIVERA MD         12/10/18


Loperamide Hcl* (Imodium*) 2 Mg Capsule, 2 MG PO .AFTER EA LOOSE BM PRN for 


DIARRHEA, #10 TAB


   Prov:DANIA OLIVERA MD         12/10/18


Nitrofurantoin Monohyd Macrocr* (Macrobid*) 100 Mg Capsr, 100 MG PO BID for 7 


Days, CAP


   Prov:DANIA OLIVERA MD         12/10/18


Loperamide Hcl* (Imodium*) 2 Mg Capsule, 2 MG PO .AFTER EA LOOSE BM PRN for 


DIARRHEA, #10 TAB


   Prov:PASILACAROL ORTAAR F         7/10/18


Acetaminophen* (Tylophen*) 500 Mg Capsule, 1 CAP PO Q6H PRN for PAIN AND OR 


ELEVATED TEMP, #20 CAP


   Prov:HUSSAINILARUIZ ORTA F         7/10/18


Amoxicillin/Potassium Clav (Amox-Clav 875-125 mg Tablet) 875-125 mg Tab, 1 TAB 


PO BID for 10 Days, #20 TAB


   Prov:PASILAMARIVELCAROLAR F         7/10/18


Ibuprofen* (Motrin*) 600 Mg Tab, 600 MG PO Q6, #30 TAB


   Prov:CAROLYNN DEAN PA-C         12/18/17


Cephalexin* (Keflex*) 500 Mg Capsule, 500 MG PO TID for 7 Days, CAP


   Prov:CAROLYNN DEAN PA-C         12/18/17


Ibuprofen* (Motrin*) 800 Mg Tab, 800 MG PO Q6H PRN for PAIN AND OR ELEVATED 


TEMP, #30 TAB


   Prov:ZAID SCHULTZ DO         10/5/17


Nitrofurantoin Monohyd Macrocr* (Macrobid*) 100 Mg Capsr, 100 MG PO BID for 7 


Days, CAP


   Prov:RUSH MCNAMARA NP         7/12/17


Acetaminophen* (Tylophen*) 500 Mg Capsule, 1 CAP PO Q6H PRN for PAIN AND OR 


ELEVATED TEMP, #20 CAP


   Prov:SUSAN PLUMMER PA-C         7/9/17


Cephalexin* (Keflex*) 500 Mg Capsule, 500 MG PO BID for 7 Days, CAP


   Prov:SUSAN PLUMMERC         7/9/17


Phenazopyridine Hcl* (Pyridium*) 200 Mg Tab, 200 MG PO TID PRN for URINARY PAIN,


#6 TAB


   Prov:ROSITA HENDRICKSON PA-C         6/20/16


Nitrofurantoin Monohyd Macrocr* (Macrobid*) 100 Mg Capsr, 100 MG PO BID for 7 


Days, CAP


   Prov:ROSITA HENDRICKSON PA-C         6/20/16


Cyclobenzaprine Hcl* (Cyclobenzaprine Hcl*) 10 Mg Tablet, 10 MG PO TID, #15 TAB


   Prov:RUSH MCNAMARA NP         9/18/15


Ibuprofen* (Motrin*) 600 Mg Tab, 600 MG PO Q6H PRN for PAIN AND OR ELEVATED 


TEMP, #30


   Prov:RUSH MCNAMARA NP         9/18/15


Reported Medications


Famotidine* (Acid Reducer*) 20 Mg Tablet


   4/14/13





Allergies


Allergies:  


Coded Allergies:  


     ciprofloxacin (Verified  Allergy, Intermediate, rashes, 7/10/18)


     morphine (Verified  Adverse Reaction, Unknown, DIARRHEA,VOMITING, 7/10/18)





PMhx/Soc


History of Surgery:  No


Anesthesia Reaction:  No


Hx Neurological Disorder:  No


Hx Respiratory Disorders:  No


Hx Cardiac Disorders:  No


Hx Psychiatric Problems:  No


Hx Miscellaneous Medical Probl:  No


Hx Alcohol Use:  No


Hx Substance Use:  No


Hx Tobacco Use:  No


Smoking Status:  Never smoker





FmHx


Family History:  No diabetes





Physical Exam


Vitals





Vital Signs


  Date      Temp  Pulse  Resp  B/P (MAP)   Pulse Ox  O2          O2 Flow    FiO2


Time                                                 Delivery    Rate


   4/26/19  98.4     63    18      149/66        99


     05:54                           (93)





Physical Exam


Const:   Moderate distress secondary to pain


Head:   Atraumatic 


Eyes:    Normal Conjunctiva


ENT:    Normal External Ears, Nose and Mouth.


Neck:               Full range of motion. No meningismus.


Resp:   Clear to auscultation bilaterally


Cardio:   Regular rate and rhythm, no murmurs


Abd:    Soft, diffuse abdominal tenderness to palpation without rebound or 


guarding


Skin:   No petechiae or rashes


Back:   No midline or flank tenderness


Ext:    No cyanosis, or edema


Neur:   Awake and alert


Psych:    Normal Mood and Affect


Results 24 hrs





Laboratory Tests


               Test
                                4/26/19
06:49


               Bedside Urine pH (LAB)                        6.5


               Bedside Urine Protein (LAB)          Negative


               Bedside Urine Glucose (UA)           Negative


               Bedside Urine Ketones (LAB)          Negative


               Bedside Urine Blood                  Negative


               Bedside Urine Nitrite (LAB)          Negative


               Bedside Urine Leukocyte
Esterase (L           1+ 






Current Medications


 Medications
   Dose
          Sig/Hair
       Start Time
   Status  Last


 (Trade)       Ordered        Route
 PRN     Stop Time              Admin
Dose


                              Reason                                Admin


 Ketorolac
     30 mg          ONCE  STAT
    4/26/19       DC           4/26/19


Tromethamine
                 IM
            06:53
                       07:03



 (Toradol)                                   4/26/19 06:54


                200 mg         ONCE  ONCE
    4/26/19       DC           4/26/19


Phenazopyridi                 PO
            07:00
                       07:03



ne
 HCl
                                     4/26/19 07:01


(Pyridium)


 Cephalexin
    500 mg         ONCE  ONCE
    4/26/19       DC           4/26/19


(Keflex)                      PO
            07:00
                       07:04



                                             4/26/19 07:01








Procedures/MDM


Urine dip positive for infection.





Patient is a 59-year-old female with no medical problems who presents with 


abdominal pain.  She was found to have acute cystitis.  I doubt sepsis at this 


time.  The patient will be discharged and will need to follow-up with a primary 


doctor within 24 to 48 hours.  She will be given Keflex for a 2-week course that


she has had frequent UTIs in the past.  I believe outpatient management is 


appropriate.  I doubt appendicitis, cholecystitis, pancreatitis, or bowel 


obstruction.  I believe the risk of doing a CT scan of the abdomen and pelvis 


outweigh the benefits.





Departure


Diagnosis:  


   Primary Impression:  


   Abdominal pain


   Abdominal location:  generalized  Qualified Codes:  R10.84 - Generalized 


   abdominal pain


   Additional Impression:  


   Cystitis


Patient Instructions:  Abdominal Pain, Cystitis


Referrals:  


ALEX REYES (PCP)





Additional Instructions:  


Llame al doctor CINTHYA y linette nickolas ALANNAH PARA DENTRO DE 1-2 KENYON.Dgale a la 


secretaria que nosotros le instruimos hacer esta alannah.Avise o llame si jeronimo 


condicin se empeora antes de la alannah. Regresa aqui si peor o no mejor.











DANIA OLIVERA MD                Apr 26, 2019 09:04

## 2019-07-12 ENCOUNTER — HOSPITAL ENCOUNTER (EMERGENCY)
Dept: HOSPITAL 10 - FTE | Age: 59
Discharge: HOME | End: 2019-07-12
Payer: COMMERCIAL

## 2019-07-12 ENCOUNTER — HOSPITAL ENCOUNTER (EMERGENCY)
Dept: HOSPITAL 91 - FTE | Age: 59
Discharge: HOME | End: 2019-07-12
Payer: COMMERCIAL

## 2019-07-12 VITALS
BODY MASS INDEX: 23.78 KG/M2 | HEIGHT: 57 IN | BODY MASS INDEX: 23.78 KG/M2 | HEIGHT: 57 IN | WEIGHT: 110.23 LBS | WEIGHT: 110.23 LBS

## 2019-07-12 VITALS — SYSTOLIC BLOOD PRESSURE: 118 MMHG | HEART RATE: 84 BPM | DIASTOLIC BLOOD PRESSURE: 57 MMHG | RESPIRATION RATE: 16 BRPM

## 2019-07-12 DIAGNOSIS — N39.0: Primary | ICD-10-CM

## 2019-07-12 LAB
ADD MAN DIFF?: NO
ADD UMIC: YES
ALANINE AMINOTRANSFERASE: 17 IU/L (ref 13–69)
ALBUMIN/GLOBULIN RATIO: 1.29
ALBUMIN: 4.4 G/DL (ref 3.3–4.9)
ALKALINE PHOSPHATASE: 84 IU/L (ref 42–121)
ANION GAP: 9 (ref 5–13)
ASPARTATE AMINO TRANSFERASE: 25 IU/L (ref 15–46)
BASOPHIL #: 0.1 10^3/UL (ref 0–0.1)
BASOPHILS %: 1 % (ref 0–2)
BILIRUBIN,DIRECT: 0 MG/DL (ref 0–0.2)
BILIRUBIN,TOTAL: 0.8 MG/DL (ref 0.2–1.3)
BLOOD UREA NITROGEN: 6 MG/DL (ref 7–20)
CALCIUM: 9.8 MG/DL (ref 8.4–10.2)
CARBON DIOXIDE: 28 MMOL/L (ref 21–31)
CHLORIDE: 106 MMOL/L (ref 97–110)
CREATININE: 0.68 MG/DL (ref 0.44–1)
EOSINOPHILS #: 0 10^3/UL (ref 0–0.5)
EOSINOPHILS %: 0.2 % (ref 0–7)
GLOBULIN: 3.4 G/DL (ref 1.3–3.2)
GLUCOSE: 99 MG/DL (ref 70–220)
HEMATOCRIT: 39.3 % (ref 37–47)
HEMOGLOBIN: 13.1 G/DL (ref 12–16)
IMMATURE GRANS #M: 0.01 10^3/UL (ref 0–0.03)
IMMATURE GRANS % (M): 0.2 % (ref 0–0.43)
LIPASE: 167 U/L (ref 23–300)
LYMPHOCYTES #: 1.7 10^3/UL (ref 0.8–2.9)
LYMPHOCYTES %: 33.5 % (ref 15–51)
MEAN CORPUSCULAR HEMOGLOBIN: 30.1 PG (ref 29–33)
MEAN CORPUSCULAR HGB CONC: 33.3 G/DL (ref 32–37)
MEAN CORPUSCULAR VOLUME: 90.3 FL (ref 82–101)
MEAN PLATELET VOLUME: 8.8 FL (ref 7.4–10.4)
MONOCYTE #: 0.4 10^3/UL (ref 0.3–0.9)
MONOCYTES %: 7 % (ref 0–11)
NEUTROPHIL #: 3 10^3/UL (ref 1.6–7.5)
NEUTROPHILS %: 58.1 % (ref 39–77)
NUCLEATED RED BLOOD CELLS #: 0 10^3/UL (ref 0–0)
NUCLEATED RED BLOOD CELLS%: 0 /100WBC (ref 0–0)
PLATELET COUNT: 303 10^3/UL (ref 140–415)
POTASSIUM: 3.7 MMOL/L (ref 3.5–5.1)
RED BLOOD COUNT: 4.35 10^6/UL (ref 4.2–5.4)
RED CELL DISTRIBUTION WIDTH: 12.7 % (ref 11.5–14.5)
SODIUM: 143 MMOL/L (ref 135–144)
TOTAL PROTEIN: 7.8 G/DL (ref 6.1–8.1)
UR ASCORBIC ACID: NEGATIVE MG/DL
UR BACTERIA: (no result) /HPF
UR BILIRUBIN (DIP): NEGATIVE MG/DL
UR BLOOD (DIP): (no result) MG/DL
UR CLARITY: (no result)
UR COLOR: YELLOW
UR GLUCOSE (DIP): NEGATIVE MG/DL
UR KETONES (DIP): NEGATIVE MG/DL
UR LEUKOCYTE ESTERASE (DIP): (no result) LEU/UL
UR NITRITE (DIP): NEGATIVE MG/DL
UR PH (DIP): 6 (ref 5–9)
UR RBC: 4 /HPF (ref 0–5)
UR SPECIFIC GRAVITY (DIP): 1.01 (ref 1–1.03)
UR SQUAMOUS EPITHELIAL CELL: (no result) /HPF
UR TOTAL PROTEIN (DIP): NEGATIVE MG/DL
UR UROBILINOGEN (DIP): NEGATIVE MG/DL
UR WBC: 35 /HPF (ref 0–5)
WHITE BLOOD COUNT: 5.1 10^3/UL (ref 4.8–10.8)

## 2019-07-12 PROCEDURE — 99284 EMERGENCY DEPT VISIT MOD MDM: CPT

## 2019-07-12 PROCEDURE — 83690 ASSAY OF LIPASE: CPT

## 2019-07-12 PROCEDURE — 36415 COLL VENOUS BLD VENIPUNCTURE: CPT

## 2019-07-12 PROCEDURE — 80053 COMPREHEN METABOLIC PANEL: CPT

## 2019-07-12 PROCEDURE — 85025 COMPLETE CBC W/AUTO DIFF WBC: CPT

## 2019-07-12 PROCEDURE — 81001 URINALYSIS AUTO W/SCOPE: CPT

## 2019-07-12 PROCEDURE — 74176 CT ABD & PELVIS W/O CONTRAST: CPT

## 2019-07-12 RX ADMIN — PHENAZOPYRIDINE HYDROCHLORIDE 1 MG: 100 TABLET ORAL at 14:08

## 2019-07-12 NOTE — ERD
ER Documentation


Chief Complaint


Chief Complaint





pt is bib self with c/o pelvic/abd pain for a few days





HPI


Patient is a 59 years old female with past medical history of recurrent UTI 


presenting to the clinic for suprapubic, abdominal pain, dysuria, urinary 


urgency, urinary frequency X few days.  Patient denies vaginal bleeding, vaginal


discharge, flank pain, fever, night sweats, chills of breath, cough.  Patient 


admits to associated chills he denies take any OTC medication.





ROS


All systems reviewed and are negative except as per history of present illness.





Medications


Home Meds


Active Scripts


Phenazopyridine Hcl* (Pyridium*) 200 Mg Tab, 200 MG PO TID PRN for URINARY PAIN,


#6 TAB


   Prov:ODALIS ROWLAND PA-C         7/12/19


Nitrofurantoin Monohyd Macrocr* (Macrobid*) 100 Mg Capsr, 100 MG PO BID for 7 


Days, #14 CAP


   Prov:ODALIS ROWLAND PA-C         7/12/19


Phenazopyridine Hcl* (Pyridium*) 200 Mg Tab, 200 MG PO TID PRN for URINARY PAIN,


#6 TAB


   Prov:DANIA OLIVERA MD         4/26/19


Cephalexin* (Keflex*) 500 Mg Capsule, 500 MG PO BID for 14 Days, CAP


   Prov:DANIA OLIVERA MD         4/26/19


Nitrofurantoin Monohyd Macrocr* (Macrobid*) 100 Mg Capsr, 100 MG PO BID for 7 


Days, CAP


   Prov:DURGA JACQUES MD         4/5/19


Phenazopyridine Hcl* (Pyridium*) 200 Mg Tab, 200 MG PO TID PRN for URINARY PAIN,


#6 TAB


   Prov:DURGA JACQUES MD         4/5/19


Acetaminophen* (Tylophen*) 500 Mg Capsule, 1 CAP PO Q6H PRN for PAIN AND OR 


ELEVATED TEMP, #20 CAP


   Prov:DURGA JACQUES MD         4/5/19


Cephalexin* (Keflex*) 500 Mg Capsule, 500 MG PO QID for 7 Days, CAP


   Prov:MARIO WATTS PA-C         2/5/19


Cephalexin* (Keflex*) 500 Mg Capsule, 500 MG PO BID for 7 Days, CAP


   Prov:GEORGES VILLEGAS MD         12/14/18


Ondansetron (Ondansetron Odt) 4 Mg Tab.rapdis, 4 MG PO Q6H PRN for NAUSEA AND/OR


VOMITING, #10 TAB


   Prov:DANIA OLIVERA MD         12/10/18


Loperamide Hcl* (Imodium*) 2 Mg Capsule, 2 MG PO .AFTER EA LOOSE BM PRN for 


DIARRHEA, #10 TAB


   Prov:DANIA OLIVERA MD         12/10/18


Nitrofurantoin Monohyd Macrocr* (Macrobid*) 100 Mg Capsr, 100 MG PO BID for 7 


Days, CAP


   Prov:DANIA OLIVERA MD         12/10/18


Loperamide Hcl* (Imodium*) 2 Mg Capsule, 2 MG PO .AFTER EA LOOSE BM PRN for 


DIARRHEA, #10 TAB


   Prov:RUIZ BARNARD         7/10/18


Acetaminophen* (Tylophen*) 500 Mg Capsule, 1 CAP PO Q6H PRN for PAIN AND OR 


ELEVATED TEMP, #20 CAP


   Prov:RUIZ BARNARD         7/10/18


Amoxicillin/Potassium Clav (Amox-Clav 875-125 mg Tablet) 875-125 mg Tab, 1 TAB 


PO BID for 10 Days, #20 TAB


   Prov:RUIZ BARNARD F         7/10/18


Ibuprofen* (Motrin*) 600 Mg Tab, 600 MG PO Q6, #30 TAB


   Prov:CAROLYNN DEAN PA-C         12/18/17


Cephalexin* (Keflex*) 500 Mg Capsule, 500 MG PO TID for 7 Days, CAP


   Prov:CAROLYNN DEAN PA-C         12/18/17


Ibuprofen* (Motrin*) 800 Mg Tab, 800 MG PO Q6H PRN for PAIN AND OR ELEVATED 


TEMP, #30 TAB


   Prov:ZAID SCHULTZ DO         10/5/17


Nitrofurantoin Monohyd Macrocr* (Macrobid*) 100 Mg Capsr, 100 MG PO BID for 7 


Days, CAP


   Prov:RUSH MCNAMARA NP         7/12/17


Acetaminophen* (Tylophen*) 500 Mg Capsule, 1 CAP PO Q6H PRN for PAIN AND OR 


ELEVATED TEMP, #20 CAP


   Prov:SUSAN PLUMMER PA-C         7/9/17


Cephalexin* (Keflex*) 500 Mg Capsule, 500 MG PO BID for 7 Days, CAP


   Prov:SUSAN PLUMMER PA-C         7/9/17


Phenazopyridine Hcl* (Pyridium*) 200 Mg Tab, 200 MG PO TID PRN for URINARY PAIN,


#6 TAB


   Prov:ROSITA HENDRICKSON PA-C         6/20/16


Nitrofurantoin Monohyd Macrocr* (Macrobid*) 100 Mg Capsr, 100 MG PO BID for 7 


Days, CAP


   Prov:ROSITA HENDRICKSON PA-C         6/20/16


Cyclobenzaprine Hcl* (Cyclobenzaprine Hcl*) 10 Mg Tablet, 10 MG PO TID, #15 TAB


   Prov:RUSH MCNAMARA NP         9/18/15


Ibuprofen* (Motrin*) 600 Mg Tab, 600 MG PO Q6H PRN for PAIN AND OR ELEVATED 


TEMP, #30


   Prov:RUSH MCNAMARA. NP         9/18/15


Reported Medications


Famotidine* (Acid Reducer*) 20 Mg Tablet


   4/14/13





Allergies


Allergies:  


Coded Allergies:  


     ciprofloxacin (Verified  Allergy, Intermediate, rashes, 7/10/18)


     morphine (Verified  Adverse Reaction, Unknown, DIARRHEA,VOMITING, 7/10/18)





PMhx/Soc


History of Surgery:  No


Anesthesia Reaction:  No


Hx Neurological Disorder:  No


Hx Respiratory Disorders:  No


Hx Cardiac Disorders:  No


Hx Psychiatric Problems:  No


Hx Miscellaneous Medical Probl:  No


Hx Alcohol Use:  No


Hx Substance Use:  No


Hx Tobacco Use:  No





FmHx


Family History:  No diabetes, No coronary disease, No other





Physical Exam


Vitals





Vital Signs


  Date      Temp  Pulse  Resp  B/P (MAP)   Pulse Ox  O2          O2 Flow    FiO2


Time                                                 Delivery    Rate


   7/12/19  98.3     84    16      118/57        98


     11:14                           (77)





Physical Exam


Const:   No acute distress


Head:   Atraumatic 


Eyes:    Normal Conjunctiva


ENT:    Normal External Ears, Nose and Mouth.


Neck:               Full range of motion. No meningismus.


Resp:   Clear to auscultation bilaterally


Cardio:   Regular rate and rhythm, no murmurs


Abd:    Soft, non tender, non distended. Normal bowel sounds


Skin:   No petechiae or rashes


Back:   No midline or flank tenderness


Ext:    No cyanosis, or edema


Neur:   Awake and alert


Psych:    Normal Mood and Affect


Result Diagram:  


7/12/19 1253                                                                    


           7/12/19 1253





Results 24 hrs





Laboratory Tests


              Test
                                  7/12/19
12:53


              White Blood Count                       5.1 10^3/ul


              Red Blood Count                        4.35 10^6/ul


              Hemoglobin                                13.1 g/dl


              Hematocrit                                   39.3 %


              Mean Corpuscular Volume                     90.3 fl


              Mean Corpuscular Hemoglobin                 30.1 pg


              Mean Corpuscular Hemoglobin
Concent      33.3 g/dl 



              Red Cell Distribution Width                  12.7 %


              Platelet Count                          303 10^3/UL


              Mean Platelet Volume                         8.8 fl


              Immature Granulocytes %                     0.200 %


              Neutrophils %                                58.1 %


              Lymphocytes %                                33.5 %


              Monocytes %                                   7.0 %


              Eosinophils %                                 0.2 %


              Basophils %                                   1.0 %


              Nucleated Red Blood Cells %             0.0 /100WBC


              Immature Granulocytes #               0.010 10^3/ul


              Neutrophils #                           3.0 10^3/ul


              Lymphocytes #                           1.7 10^3/ul


              Monocytes #                             0.4 10^3/ul


              Eosinophils #                           0.0 10^3/ul


              Basophils #                             0.1 10^3/ul


              Nucleated Red Blood Cells #             0.0 10^3/ul


              Urine Color                          YELLOW


              Urine Clarity
                       SLIGHTLY
CLOUDY


              Urine pH                                        6.0


              Urine Specific Gravity                        1.006


              Urine Ketones                        NEGATIVE mg/dL


              Urine Nitrite                        NEGATIVE mg/dL


              Urine Bilirubin                      NEGATIVE mg/dL


              Urine Urobilinogen                   NEGATIVE mg/dL


              Urine Leukocyte Esterase                  3+ Shanique/ul


              Urine Microscopic RBC                        4 /HPF


              Urine Microscopic WBC                       35 /HPF


              Urine Squamous Epithelial
Cells      FEW /HPF 



              Urine Bacteria                       FEW /HPF


              Urine Hemoglobin                           1+ mg/dL


              Urine Glucose                        NEGATIVE mg/dL


              Urine Total Protein                  NEGATIVE mg/dl


              Sodium Level                             143 mmol/L


              Potassium Level                          3.7 mmol/L


              Chloride Level                           106 mmol/L


              Carbon Dioxide Level                      28 mmol/L


              Anion Gap                                         9


              Blood Urea Nitrogen                         6 mg/dl


              Creatinine                               0.68 mg/dl


              Est Glomerular Filtrat Rate
mL/min   > 60 mL/min 



              Glucose Level                              99 mg/dl


              Calcium Level                             9.8 mg/dl


              Total Bilirubin                           0.8 mg/dl


              Direct Bilirubin                         0.00 mg/dl


              Indirect Bilirubin                        0.8 mg/dl


              Aspartate Amino Transf
(AST/SGOT)          25 IU/L 



              Alanine Aminotransferase
(ALT/SGPT)        17 IU/L 



              Alkaline Phosphatase                        84 IU/L


              Total Protein                              7.8 g/dl


              Albumin                                    4.4 g/dl


              Globulin                                  3.40 g/dl


              Albumin/Globulin Ratio                         1.29


              Lipase                                      167 U/L





Current Medications


 Medications
   Dose
          Sig/Hair
       Start Time
   Status  Last


 (Trade)       Ordered        Route
 PRN     Stop Time              Admin
Dose


                              Reason                                Admin


                200 mg         ONCE  ONCE
    7/12/19                



Phenazopyridi                 PO
            14:30



ne
 HCl
                                     7/12/19 14:31


(Pyridium)








Procedures/MDM


Patient was seen and evaluated for dysuria and suprapubic pain.  CBC, CMP, 


lipase, urinalysis revealed leukocyte esterase and urine white blood cells 


otherwise unremarkable.  CT of pelvis and abdomen revealed There is a fecal 


filled colon.No evidence of bowel obstruction or inflammation.   There is no 


appendicitis. There is diverticulosis without diverticulitis. Atherosclerotic 


disease is present. No renal or ureteral calculi with no evidence of 


hydronephrosis.  Patient is experiencing UTI and was given Pyridium 200mg in ED.





Patient is stable ready for discharge.  Follow-up with PCP.  Patient be 


discharged with Macrobid X 7 days and Pyridium.





Departure


Diagnosis:  


   Primary Impression:  


   UTI (urinary tract infection)


   Urinary tract infection type:  site unspecified  Hematuria presence:  without


   hematuria  Qualified Codes:  N39.0 - Urinary tract infection, site not 


   specified


Condition:  Stable


Patient Instructions:  Understanding Urinary Tract Infections (UTIs)


Referrals:  


[facility]





Additional Instructions:  


Paciente aconseja volver a Departamento de urgencias inmediatamente para 


sntomas nuevos o que empeoran . Paciente aconseja posteriores con el PCP en 2-3


saleh . Paciente verbaliza la comprehensin y est de acuerdo con el tratamiento 


y el curso de accin.














Si el paciente no tiene ninguna de atencin primaria pueden seguir con





























[facility]





[address] [place] [address]














o














[facility] + [address] [address]











ODALIS ROWLAND PA-C               Jul 12, 2019 14:04

## 2019-07-12 NOTE — EN
Date/Time of Note


Date/Time of Note


DATE: 7/12/19 


TIME: 12:30





ER Progress Note


59-year-old female, presents the emergency department, complaining of dysuria 


and pelvic pain, the patient has history of multiple UTIs, I will start work-up 


in the triage area, patient stable to go to ED 2.











EMETERIO HAYES MD      Jul 12, 2019 12:31

## 2019-07-24 ENCOUNTER — HOSPITAL ENCOUNTER (EMERGENCY)
Dept: HOSPITAL 91 - E/R | Age: 59
Discharge: HOME | End: 2019-07-24
Payer: COMMERCIAL

## 2019-07-24 ENCOUNTER — HOSPITAL ENCOUNTER (EMERGENCY)
Dept: HOSPITAL 10 - E/R | Age: 59
Discharge: HOME | End: 2019-07-24
Payer: COMMERCIAL

## 2019-07-24 VITALS — HEART RATE: 50 BPM | SYSTOLIC BLOOD PRESSURE: 104 MMHG | DIASTOLIC BLOOD PRESSURE: 66 MMHG | RESPIRATION RATE: 18 BRPM

## 2019-07-24 VITALS
HEIGHT: 55 IN | WEIGHT: 82.67 LBS | HEIGHT: 55 IN | BODY MASS INDEX: 19.13 KG/M2 | BODY MASS INDEX: 19.13 KG/M2 | WEIGHT: 82.67 LBS

## 2019-07-24 DIAGNOSIS — E86.0: ICD-10-CM

## 2019-07-24 DIAGNOSIS — I10: ICD-10-CM

## 2019-07-24 DIAGNOSIS — R10.9: ICD-10-CM

## 2019-07-24 DIAGNOSIS — N39.0: Primary | ICD-10-CM

## 2019-07-24 LAB
ADD MAN DIFF?: NO
ADD UMIC: YES
ALANINE AMINOTRANSFERASE: 18 IU/L (ref 13–69)
ALBUMIN/GLOBULIN RATIO: 1.27
ALBUMIN: 4.2 G/DL (ref 3.3–4.9)
ALKALINE PHOSPHATASE: 86 IU/L (ref 42–121)
AMYLASE: 102 U/L (ref 11–123)
ANION GAP: 8 (ref 5–13)
ASPARTATE AMINO TRANSFERASE: 40 IU/L (ref 15–46)
BASOPHIL #: 0 10^3/UL (ref 0–0.1)
BASOPHILS %: 0.6 % (ref 0–2)
BILIRUBIN,DIRECT: 0 MG/DL (ref 0–0.2)
BILIRUBIN,TOTAL: 0.6 MG/DL (ref 0.2–1.3)
BLOOD UREA NITROGEN: 3 MG/DL (ref 7–20)
CALCIUM: 9.6 MG/DL (ref 8.4–10.2)
CARBON DIOXIDE: 29 MMOL/L (ref 21–31)
CHLORIDE: 104 MMOL/L (ref 97–110)
CREATININE: 0.77 MG/DL (ref 0.44–1)
EOSINOPHILS #: 0 10^3/UL (ref 0–0.5)
EOSINOPHILS %: 0.6 % (ref 0–7)
GLOBULIN: 3.3 G/DL (ref 1.3–3.2)
GLUCOSE: 92 MG/DL (ref 70–220)
HEMATOCRIT: 37.8 % (ref 37–47)
HEMOGLOBIN: 12.3 G/DL (ref 12–16)
IMMATURE GRANS #M: 0.01 10^3/UL (ref 0–0.03)
IMMATURE GRANS % (M): 0.2 % (ref 0–0.43)
INR: 0.9
LIPASE: 115 U/L (ref 23–300)
LYMPHOCYTES #: 2.8 10^3/UL (ref 0.8–2.9)
LYMPHOCYTES %: 43.7 % (ref 15–51)
MEAN CORPUSCULAR HEMOGLOBIN: 30.2 PG (ref 29–33)
MEAN CORPUSCULAR HGB CONC: 32.5 G/DL (ref 32–37)
MEAN CORPUSCULAR VOLUME: 92.9 FL (ref 82–101)
MEAN PLATELET VOLUME: 9.5 FL (ref 7.4–10.4)
MONOCYTE #: 0.5 10^3/UL (ref 0.3–0.9)
MONOCYTES %: 8 % (ref 0–11)
NEUTROPHIL #: 3 10^3/UL (ref 1.6–7.5)
NEUTROPHILS %: 46.9 % (ref 39–77)
NUCLEATED RED BLOOD CELLS #: 0 10^3/UL (ref 0–0)
NUCLEATED RED BLOOD CELLS%: 0 /100WBC (ref 0–0)
PARTIAL THROMBOPLASTIN TIME: 27.6 SEC (ref 23–35)
PLATELET COUNT: 302 10^3/UL (ref 140–415)
POTASSIUM: 3.5 MMOL/L (ref 3.5–5.1)
PROTIME: 12.3 SEC (ref 11.9–14.9)
PT RATIO: 1
RED BLOOD COUNT: 4.07 10^6/UL (ref 4.2–5.4)
RED CELL DISTRIBUTION WIDTH: 12.7 % (ref 11.5–14.5)
SODIUM: 141 MMOL/L (ref 135–144)
TOTAL PROTEIN: 7.5 G/DL (ref 6.1–8.1)
TROPONIN-I: < 0.012 NG/ML (ref 0–0.12)
UR ASCORBIC ACID: NEGATIVE MG/DL
UR BACTERIA: (no result) /HPF
UR BILIRUBIN (DIP): NEGATIVE MG/DL
UR BLOOD (DIP): (no result) MG/DL
UR CLARITY: (no result)
UR COLOR: (no result)
UR GLUCOSE (DIP): NEGATIVE MG/DL
UR KETONES (DIP): NEGATIVE MG/DL
UR LEUKOCYTE ESTERASE (DIP): (no result) LEU/UL
UR NITRITE (DIP): POSITIVE MG/DL
UR PH (DIP): 6 (ref 5–9)
UR RBC: 5 /HPF (ref 0–5)
UR SPECIFIC GRAVITY (DIP): 1.01 (ref 1–1.03)
UR SQUAMOUS EPITHELIAL CELL: (no result) /HPF
UR TOTAL PROTEIN (DIP): NEGATIVE MG/DL
UR UROBILINOGEN (DIP): NEGATIVE MG/DL
UR WBC: 57 /HPF (ref 0–5)
WHITE BLOOD COUNT: 6.4 10^3/UL (ref 4.8–10.8)

## 2019-07-24 PROCEDURE — 85730 THROMBOPLASTIN TIME PARTIAL: CPT

## 2019-07-24 PROCEDURE — 81001 URINALYSIS AUTO W/SCOPE: CPT

## 2019-07-24 PROCEDURE — 80053 COMPREHEN METABOLIC PANEL: CPT

## 2019-07-24 PROCEDURE — 93005 ELECTROCARDIOGRAM TRACING: CPT

## 2019-07-24 PROCEDURE — 83690 ASSAY OF LIPASE: CPT

## 2019-07-24 PROCEDURE — 84484 ASSAY OF TROPONIN QUANT: CPT

## 2019-07-24 PROCEDURE — 85025 COMPLETE CBC W/AUTO DIFF WBC: CPT

## 2019-07-24 PROCEDURE — 82150 ASSAY OF AMYLASE: CPT

## 2019-07-24 PROCEDURE — 85610 PROTHROMBIN TIME: CPT

## 2019-07-24 PROCEDURE — 96374 THER/PROPH/DIAG INJ IV PUSH: CPT

## 2019-07-24 PROCEDURE — 87086 URINE CULTURE/COLONY COUNT: CPT

## 2019-07-24 PROCEDURE — 96375 TX/PRO/DX INJ NEW DRUG ADDON: CPT

## 2019-07-24 PROCEDURE — 74177 CT ABD & PELVIS W/CONTRAST: CPT

## 2019-07-24 PROCEDURE — 99285 EMERGENCY DEPT VISIT HI MDM: CPT

## 2019-07-24 RX ADMIN — VASOPRESSIN 1: 20 INJECTION, SOLUTION INTRAMUSCULAR; SUBCUTANEOUS at 18:40

## 2019-07-24 RX ADMIN — CEFTRIAXONE 1 MLS/HR: 1 INJECTION, SOLUTION INTRAVENOUS at 20:52

## 2019-07-24 RX ADMIN — IOHEXOL 1 ML: 300 INJECTION, SOLUTION INTRAVENOUS at 18:40

## 2019-07-24 RX ADMIN — SODIUM CHLORIDE 1 ML: 9 INJECTION, SOLUTION INTRAMUSCULAR; INTRAVENOUS; SUBCUTANEOUS at 18:40

## 2019-07-24 RX ADMIN — ONDANSETRON HYDROCHLORIDE 1 MG: 2 INJECTION, SOLUTION INTRAMUSCULAR; INTRAVENOUS at 16:54

## 2019-07-24 RX ADMIN — THIAMINE HYDROCHLORIDE 1 MLS/HR: 100 INJECTION, SOLUTION INTRAMUSCULAR; INTRAVENOUS at 16:54

## 2019-07-24 RX ADMIN — DIPHENOXYLATE HYDROCHLORIDE AND ATROPINE SULFATE 1 TAB: 2.5; .025 TABLET ORAL at 20:58

## 2019-07-24 RX ADMIN — KETOROLAC TROMETHAMINE 1 MG: 30 INJECTION, SOLUTION INTRAMUSCULAR at 16:54

## 2019-07-24 NOTE — ERD
ER Documentation


Chief Complaint


Chief Complaint





diarrhea+ abdominal cramping x4d. no vomiting.





HPI


This is a 59-year-old [place]vian-speaking female with a past medical history of 


hypertension who presents to the emergency department complaining of frequency 


urgency and dysuria that is been present for several months.  The patient ind


icates she has a history of frequent urinary tract infections.  She states that 


for she has been on and off antibiotics for several years.  Most recently on 


July 12, 2019 the patient presented to [facility] for urinary 


tract infection was placed on Macrobid which she took for 7 days.  Prior to that


she is also been seen for similar symptoms in February 2019 and had been placed 


on Keflex.  She indicates that 4 days ago she developed severe abdominal 


cramping in the left lower quadrant.  This was followed by multiple episodes of 


loose watery stools roughly 5 episodes per day.  She states that the abdominal 


cramping is worse when she is experiencing the diarrhea.  She has not 


experienced any emesis.  She denies any hemoptysis hematemesis or melanotic 


stools.  She said no fevers or shaking no chills.  She has had a decrease in 


appetite for the past 4 days.





ROS


All systems reviewed and are negative except as per history of present illness.





Medications


Home Meds


Active Scripts


Phenazopyridine Hcl* (Pyridium*) 200 Mg Tab, 200 MG PO TID PRN for URINARY PAIN,


#6 TAB


   Prov:ODALIS ROWLAND PA-C         7/12/19


Nitrofurantoin Monohyd Macrocr* (Macrobid*) 100 Mg Capsr, 100 MG PO BID for 7 


Days, #14 CAP


   Prov:ODALIS ROWLAND PA-C         7/12/19


Acetaminophen* (Tylophen*) 500 Mg Capsule, 1 CAP PO Q6H PRN for PAIN AND OR 


ELEVATED TEMP, #20 CAP


   Prov:DURGA JACQUES MD         4/5/19


Discontinued Reported Medications


Famotidine* (Acid Reducer*) 20 Mg Tablet


   4/14/13


Discontinued Scripts


Phenazopyridine Hcl* (Pyridium*) 200 Mg Tab, 200 MG PO TID PRN for URINARY PAIN,


#6 TAB


   Prov:DANIA OLIVERA MD         4/26/19


Cephalexin* (Keflex*) 500 Mg Capsule, 500 MG PO BID for 14 Days, CAP


   Prov:DANIA OLIVERA MD         4/26/19


Nitrofurantoin Monohyd Macrocr* (Macrobid*) 100 Mg Capsr, 100 MG PO BID for 7 


Days, CAP


   Prov:DURGA JACQUES MD         4/5/19


Phenazopyridine Hcl* (Pyridium*) 200 Mg Tab, 200 MG PO TID PRN for URINARY PAIN,


#6 TAB


   Prov:DURGA JACQUES MD         4/5/19


Cephalexin* (Keflex*) 500 Mg Capsule, 500 MG PO QID for 7 Days, CAP


   Prov:MARIO WATTS PA-C         2/5/19


Cephalexin* (Keflex*) 500 Mg Capsule, 500 MG PO BID for 7 Days, CAP


   Prov:GEORGES VILLEGAS MD         12/14/18


Ondansetron (Ondansetron Odt) 4 Mg Tab.rapdis, 4 MG PO Q6H PRN for NAUSEA AND/OR


VOMITING, #10 TAB


   Prov:DANIA OLIVERA MD         12/10/18


Loperamide Hcl* (Imodium*) 2 Mg Capsule, 2 MG PO .AFTER EA LOOSE BM PRN for 


DIARRHEA, #10 TAB


   Prov:DANIA OLIVERA MD         12/10/18


Nitrofurantoin Monohyd Macrocr* (Macrobid*) 100 Mg Capsr, 100 MG PO BID for 7 


Days, CAP


   Prov:DANIA OLIVERA MD         12/10/18


Loperamide Hcl* (Imodium*) 2 Mg Capsule, 2 MG PO .AFTER EA LOOSE BM PRN for 


DIARRHEA, #10 TAB


   Prov:RUIZ BARNARD         7/10/18


Acetaminophen* (Tylophen*) 500 Mg Capsule, 1 CAP PO Q6H PRN for PAIN AND OR 


ELEVATED TEMP, #20 CAP


   Prov:PASILARUIZ ORTA         7/10/18


Amoxicillin/Potassium Clav (Amox-Clav 875-125 mg Tablet) 875-125 mg Tab, 1 TAB 


PO BID for 10 Days, #20 TAB


   Prov:RUIZ BARNARD         7/10/18


Ibuprofen* (Motrin*) 600 Mg Tab, 600 MG PO Q6, #30 TAB


   Prov:CAROLYNN DEAN PA-C         12/18/17


Cephalexin* (Keflex*) 500 Mg Capsule, 500 MG PO TID for 7 Days, CAP


   Prov:CAROLYNN DEAN PA-C         12/18/17


Ibuprofen* (Motrin*) 800 Mg Tab, 800 MG PO Q6H PRN for PAIN AND OR ELEVATED 


TEMP, #30 TAB


   Prov:ZAID SCHULTZ DO         10/5/17


Nitrofurantoin Monohyd Macrocr* (Macrobid*) 100 Mg Capsr, 100 MG PO BID for 7 


Days, CAP


   Prov:RUSH MCNAMARA NP         7/12/17


Acetaminophen* (Tylophen*) 500 Mg Capsule, 1 CAP PO Q6H PRN for PAIN AND OR 


ELEVATED TEMP, #20 CAP


   Prov:SUSAN PLUMMER PA-C         7/9/17


Cephalexin* (Keflex*) 500 Mg Capsule, 500 MG PO BID for 7 Days, CAP


   Prov:SUSAN PLUMMERC         7/9/17


Phenazopyridine Hcl* (Pyridium*) 200 Mg Tab, 200 MG PO TID PRN for URINARY PAIN,


#6 TAB


   Prov:ROSITA HENDRICKSON PA-C         6/20/16


Nitrofurantoin Monohyd Macrocr* (Macrobid*) 100 Mg Capsr, 100 MG PO BID for 7 


Days, CAP


   Prov:ROSITA HENDRICKSON PA-C         6/20/16


Cyclobenzaprine Hcl* (Cyclobenzaprine Hcl*) 10 Mg Tablet, 10 MG PO TID, #15 TAB


   Prov:RUSH MCNAMARA NP         9/18/15


Ibuprofen* (Motrin*) 600 Mg Tab, 600 MG PO Q6H PRN for PAIN AND OR ELEVATED 


TEMP, #30


   Prov:RUSH MCNAMARA NP         9/18/15





Allergies


Allergies:  


Coded Allergies:  


     ciprofloxacin (Verified  Allergy, Intermediate, rashes, 7/24/19)


     morphine (Verified  Adverse Reaction, Unknown, DIARRHEA,VOMITING, 7/24/19)





PMhx/Soc


History of Surgery:  No


Anesthesia Reaction:  No


Hx Neurological Disorder:  No


Hx Respiratory Disorders:  No


Hx Cardiac Disorders:  No


Hx Psychiatric Problems:  No


Hx Miscellaneous Medical Probl:  No


Hx Alcohol Use:  No


Hx Substance Use:  No


Hx Tobacco Use:  No





Physical Exam


Vitals





Vital Signs


  Date      Temp  Pulse  Resp  B/P (MAP)   Pulse Ox  O2          O2 Flow    FiO2


Time                                                 Delivery    Rate


   7/24/19  98.5     57    16      100/55        98


     15:40                           (70)





Physical Exam


Constitutional:Well-developed.  Well-nourished.


HEENT:Normocephalic. Atraumatic.Pupils were equal round reactive to light.  Dry 


mucous membranes.No tonsillar exudates.


Neck: No nuchal rigidity. No lymphadenopathy. No posterior cervical spine 


tenderness or step-offs.


Respiratory: Not using accessory muscles of respiration.Lungs were clear to 


auscultation bilaterally. No rhonchi. No rales. No wheezing. 


Cardiovascular: Bradycardic with regular rhythm.No murmurs. No rubs were 


appreciated.S1, S2 normal. Distal pulses are palpable 2+ bilaterally.


GI: Abdomen was soft.  Left lower quadrant tenderness with voluntary guarding.  


Non Distended. No pulsatile abdominal masses or bruits. No rebound. No guarding.


Bowel sounds were present and normal. 


Muscle skeletal: Full range of motion of both the upper and lower extremities 


bilaterally.Normal muscle tone.No assymetrical calf tenderness or swelling. 


Skin: No petechia, no purpura. No lesions on the palms or the soles of the feet.


No maculopapular rash.


NEURO: Patient was alert, awake, orientated x3.No facial droop. Gait observed 


and normal with no ataxia.Speech had regular rate and rhythm. No focal 


neurological deficits.


Result Diagram:  


7/24/19 1652                                                                    


           7/24/19 1652





Results 24 hrs





Laboratory Tests


              Test
                                  7/24/19
16:52


              White Blood Count                       6.4 10^3/ul


              Red Blood Count                        4.07 10^6/ul


              Hemoglobin                                12.3 g/dl


              Hematocrit                                   37.8 %


              Mean Corpuscular Volume                     92.9 fl


              Mean Corpuscular Hemoglobin                 30.2 pg


              Mean Corpuscular Hemoglobin
Concent      32.5 g/dl 



              Red Cell Distribution Width                  12.7 %


              Platelet Count                          302 10^3/UL


              Mean Platelet Volume                         9.5 fl


              Immature Granulocytes %                     0.200 %


              Neutrophils %                                46.9 %


              Lymphocytes %                                43.7 %


              Monocytes %                                   8.0 %


              Eosinophils %                                 0.6 %


              Basophils %                                   0.6 %


              Nucleated Red Blood Cells %             0.0 /100WBC


              Immature Granulocytes #               0.010 10^3/ul


              Neutrophils #                           3.0 10^3/ul


              Lymphocytes #                           2.8 10^3/ul


              Monocytes #                             0.5 10^3/ul


              Eosinophils #                           0.0 10^3/ul


              Basophils #                             0.0 10^3/ul


              Nucleated Red Blood Cells #             0.0 10^3/ul


              Prothrombin Time                           12.3 Sec


              Prothrombin Time Ratio                          1.0


              INR International Normalized
Ratio            0.90 



              Activated Partial
Thromboplast Time       27.6 Sec 



              Urine Color                          KAUR


              Urine Clarity
                       SLIGHTLY
CLOUDY


              Urine pH                                        6.0


              Urine Specific Gravity                        1.014


              Urine Ketones                        NEGATIVE mg/dL


              Urine Nitrite                        POSITIVE mg/dL


              Urine Bilirubin                      NEGATIVE mg/dL


              Urine Urobilinogen                   NEGATIVE mg/dL


              Urine Leukocyte Esterase                  3+ Shanique/ul


              Urine Microscopic RBC                        5 /HPF


              Urine Microscopic WBC                       57 /HPF


              Urine Squamous Epithelial
Cells      FEW /HPF 



              Urine Bacteria                       FEW /HPF


              Urine Hemoglobin                           1+ mg/dL


              Urine Glucose                        NEGATIVE mg/dL


              Urine Total Protein                  NEGATIVE mg/dl


              Sodium Level                             141 mmol/L


              Potassium Level                          3.5 mmol/L


              Chloride Level                           104 mmol/L


              Carbon Dioxide Level                      29 mmol/L


              Anion Gap                                         8


              Blood Urea Nitrogen                         3 mg/dl


              Creatinine                               0.77 mg/dl


              Est Glomerular Filtrat Rate
mL/min   > 60 mL/min 



              Glucose Level                              92 mg/dl


              Calcium Level                             9.6 mg/dl


              Total Bilirubin                           0.6 mg/dl


              Direct Bilirubin                         0.00 mg/dl


              Indirect Bilirubin                        0.6 mg/dl


              Aspartate Amino Transf
(AST/SGOT)          40 IU/L 



              Alanine Aminotransferase
(ALT/SGPT)        18 IU/L 



              Alkaline Phosphatase                        86 IU/L


              Troponin I                           < 0.012 ng/ml


              Total Protein                              7.5 g/dl


              Albumin                                    4.2 g/dl


              Globulin                                  3.30 g/dl


              Albumin/Globulin Ratio                         1.27


              Amylase Level                               102 U/L


              Lipase                                      115 U/L





Current Medications


 Medications
   Dose
          Sig/Hair
       Start Time
   Status  Last


 (Trade)       Ordered        Route
 PRN     Stop Time              Admin
Dose


                              Reason                                Admin


 Sodium         1,000 ml @ 
   Q1H STAT
      7/24/19       DC           7/24/19


Chloride       1,000 mls/hr   IV
            16:43
                       16:54



                                             7/24/19 17:42


 Ondansetron    4 mg           ONCE  STAT
    7/24/19       DC           7/24/19


HCl
  (Zofran                 IV
            16:43
                       16:54



Inj)                                         7/24/19 16:47


 Ketorolac
     30 mg          ONCE  STAT
    7/24/19       DC           7/24/19


Tromethamine
                 IV
            16:43
                       16:54



 (Toradol)                                   7/24/19 16:47


                1 tab          ONCE  ONCE
    7/24/19       DC           7/24/19


Diphenoxylate                 PO
            17:00
                       20:58



HCl/
                                        7/24/19 17:01


Atropine



(Lomotil)


 IV Flush
      10 ml          STK-MED        7/24/19       DC       



(NS 10 ml)                    ONCE
 .ROUTE
  18:40



                                             7/24/19 18:41


 Sodium         100 ml @ ud    STK-MED        7/24/19       DC       



Chloride                      ONCE
 .ROUTE
  18:40



                                             7/24/19 18:41


 Iohexol
       150 ml         STK-MED        7/24/19       DC       



(Omnipaque                    ONCE
 .ROUTE
  18:40



300mg/
 ml)                                  7/24/19 18:41


 Ceftriaxone    50 ml @ 
      ONCE  ONCE
    7/24/19       DC           7/24/19


Sodium         100 mls/hr     IVPB
          21:00
                       20:52



                                             7/24/19 21:29








Procedures/MDM


This patient presented to the emergency department with abdominal pain and was 


seen and evaluated by myself. My differential diagnosis included but was not 


limited to abdominal aortic aneurysm, appendicitis, pancreatitis, perforated 


peptic ulcer, perforated viscus, Boerhaave's syndrome or visceral pain such as 


diverticulitis, DKA, esophagitis, hepatitis or bowel obstruction.





The patient was placed on a cardiac monitor, continuous pulse oximetry, and IV 


access was established by nursing staff.  The patient had signs of clinical 


dehydration was given IV fluids.  She was also given IV Toradol for analgesia 


control she states she has an allergy to morphine which causes a severe rash and


nausea.





I obtained a 12-lead EKG tracing to rule out for atypical microinfarction.  12 


Lead EKG tracing ordered and reviewed by myself showed: 


Bradycardic of 46 bpm and no arrhythmia.


AL interval normal.


QRS duration normal.


No ST segment elevation


No ST segment depression. No changes consistent with acute ischemia. 





The patient did have a recurrent urinary tract infection.  I reviewed the 


previous urinalysis taken 2 weeks ago and there was not a urine culture 


obtained.  The patient been placed on Macrobid.  Therefore the patient was given


1 g of ceftriaxone in the emergency department.  She was afebrile with no 


leukocytosis.  CT scan of the abdomen showed no evidence of diverticulitis.  


Given that the patient was on Macrobid my clinical suspicion was low for C. 


difficile.  She was given Lomotil in the emergency department.  She had no 


severe electrolyte abnormalities.





Observation Note:


Time:   4 hours


Family Hx:   No Hypertension


Evaluation:   Multiple exams showed improving symptoms and no evidence of 


recurrent diarrhea in the emergency department.  She stated she felt comfortable


being discharged home and was refusing admission to the hospital she was offered


observation for admission and IV antibiotics.  She will be sent home with 


Keflex.  Urine culture was sent.  The patient was discharged home in fair 


condition. They were instructed to return to the emergency department at any 


time if there was any worsening of their condition. The patient stated they 


would follow up with their PCP in the next 24-48 hours to initiate a suitable 


medication regimen under the care of their PCP as well as to allow their PCP to 


monitor any drug reactions. The patient was discharged home with prescriptions 


after they gave informed consent to the new medication.  They were also fully 


informed by myself on the adverse effects and adverse drug interactions in order


to provide adequate safeguards to prevent possible adverse reactions to 


medications. 

















The patient did have a urinary tract infection which was with worsening pyuria 


from her previous visit on July 12 roughly 2 weeks prior to arrival.  There was 


no urine culture that was sent at that time.  The patient had no leukocytosis.  


No severe electrolyte abnormalities.  The patient also had a CT scan was 


performed on July 12, 2019 that showed diverticulosis without diverticulitis.  


However given that the patient had voluntary guarding with severe pain in the 


left lower quadrant I did feel is necessary to repeat the CT scan to rule out 


for abdominal perforation.  The CT scan was reviewed by the radiologist and 


indicate the following:





Departure


Diagnosis:  


   Primary Impression:  


   UTI (urinary tract infection)


   Urinary tract infection type:  acute cystitis  Hematuria presence:  without 


   hematuria  Qualified Codes:  N30.00 - Acute cystitis without hematuria


   Additional Impression:  


   Diarrhea with dehydration


Condition:  Fair











ALONZO HANKS MD            Jul 24, 2019 17:52

## 2019-07-26 ENCOUNTER — HOSPITAL ENCOUNTER (EMERGENCY)
Dept: HOSPITAL 91 - FTE | Age: 59
Discharge: HOME | End: 2019-07-26
Payer: COMMERCIAL

## 2019-07-26 ENCOUNTER — HOSPITAL ENCOUNTER (EMERGENCY)
Dept: HOSPITAL 10 - FTE | Age: 59
Discharge: HOME | End: 2019-07-26
Payer: COMMERCIAL

## 2019-07-26 VITALS
WEIGHT: 85.32 LBS | WEIGHT: 85.32 LBS | HEIGHT: 55 IN | HEIGHT: 55 IN | BODY MASS INDEX: 19.74 KG/M2 | BODY MASS INDEX: 19.74 KG/M2

## 2019-07-26 VITALS — RESPIRATION RATE: 16 BRPM | HEART RATE: 54 BPM | SYSTOLIC BLOOD PRESSURE: 112 MMHG | DIASTOLIC BLOOD PRESSURE: 57 MMHG

## 2019-07-26 DIAGNOSIS — F41.9: Primary | ICD-10-CM

## 2019-07-26 PROCEDURE — 99283 EMERGENCY DEPT VISIT LOW MDM: CPT

## 2019-07-26 RX ADMIN — LORAZEPAM 1 MG: 0.5 TABLET ORAL at 16:23

## 2019-07-26 NOTE — ERD
ER Documentation


Chief Complaint


Chief Complaint





bilateral arm pain and weakness x 1 day





HPI


59-year-old female, with multiple medical problems, recently diagnosed with UTI,


presents the emergency department, complaining of anxiety for 4 days.  The 


patient reports bilateral finger paresthesia, difficulty falling asleep and 


anxiety.  Otherwise, in regards of her urinary symptoms, the patient refers 


feeling better, no fever, no chills, no abdominal pain, no hematuria.  She 


reports good compliance with medications without side effects.





ROS


All systems reviewed and are negative except as per history of present illness.





Medications


Home Meds


Active Scripts


Diphenoxylate HCl/Atropine (Lomotil 2.5-0.025 mg Tablet) 1 Each Tablet, 1 TAB PO


QID PRN for DIARRHEA, #10 TAB


   Prov:ALONZO HANKS MD         7/24/19


Cephalexin* (Keflex*) 500 Mg Capsule, 500 MG PO Q8 for 10 Days, #30 CAP


   Prov:ALONZO HANKS MD         7/24/19


Phenazopyridine Hcl* (Pyridium*) 200 Mg Tab, 200 MG PO TID PRN for URINARY PAIN,


#6 TAB


   Prov:ODALIS ROWLAND PA-C         7/12/19


Nitrofurantoin Monohyd Macrocr* (Macrobid*) 100 Mg Capsr, 100 MG PO BID for 7 


Days, #14 CAP


   Prov:ODALIS ROWLAND PA-C         7/12/19


Acetaminophen* (Tylophen*) 500 Mg Capsule, 1 CAP PO Q6H PRN for PAIN AND OR 


ELEVATED TEMP, #20 CAP


   Prov:DURGA JACQUES MD         4/5/19


Discontinued Reported Medications


Famotidine* (Acid Reducer*) 20 Mg Tablet


   4/14/13


Discontinued Scripts


Phenazopyridine Hcl* (Pyridium*) 200 Mg Tab, 200 MG PO TID PRN for URINARY PAIN,


#6 TAB


   Prov:DANIA OLIVERA MD         4/26/19


Cephalexin* (Keflex*) 500 Mg Capsule, 500 MG PO BID for 14 Days, CAP


   Prov:DANIA OLIVERA MD         4/26/19


Nitrofurantoin Monohyd Macrocr* (Macrobid*) 100 Mg Capsr, 100 MG PO BID for 7 


Days, CAP


   Prov:DURGA JACQUES MD         4/5/19


Phenazopyridine Hcl* (Pyridium*) 200 Mg Tab, 200 MG PO TID PRN for URINARY PAIN,


#6 TAB


   Prov:DURGA JACQUES MD         4/5/19


Cephalexin* (Keflex*) 500 Mg Capsule, 500 MG PO QID for 7 Days, CAP


   Prov:MARIO WATTS PA-C         2/5/19


Cephalexin* (Keflex*) 500 Mg Capsule, 500 MG PO BID for 7 Days, CAP


   Prov:GEORGES VILLEGAS MD         12/14/18


Ondansetron (Ondansetron Odt) 4 Mg Tab.rapdis, 4 MG PO Q6H PRN for NAUSEA AND/OR


VOMITING, #10 TAB


   Prov:DANIA OLIVERA MD         12/10/18


Loperamide Hcl* (Imodium*) 2 Mg Capsule, 2 MG PO .AFTER EA LOOSE BM PRN for 


DIARRHEA, #10 TAB


   Prov:DANIA OLIVERA MD         12/10/18


Nitrofurantoin Monohyd Macrocr* (Macrobid*) 100 Mg Capsr, 100 MG PO BID for 7 


Days, CAP


   Prov:DANIA OLIVERA MD         12/10/18


Loperamide Hcl* (Imodium*) 2 Mg Capsule, 2 MG PO .AFTER EA LOOSE BM PRN for 


DIARRHEA, #10 TAB


   Prov:RUIZ BARNARD         7/10/18


Acetaminophen* (Tylophen*) 500 Mg Capsule, 1 CAP PO Q6H PRN for PAIN AND OR 


ELEVATED TEMP, #20 CAP


   Prov:RUIZ BARNARD         7/10/18


Amoxicillin/Potassium Clav (Amox-Clav 875-125 mg Tablet) 875-125 mg Tab, 1 TAB 


PO BID for 10 Days, #20 TAB


   Prov:RUIZ BARNARD         7/10/18


Ibuprofen* (Motrin*) 600 Mg Tab, 600 MG PO Q6, #30 TAB


   Prov:CAROLYNN DEAN PA-C         12/18/17


Cephalexin* (Keflex*) 500 Mg Capsule, 500 MG PO TID for 7 Days, CAP


   Prov:CAROLYNN DEAN PA-C         12/18/17


Ibuprofen* (Motrin*) 800 Mg Tab, 800 MG PO Q6H PRN for PAIN AND OR ELEVATED 


TEMP, #30 TAB


   Prov:ZAID SCHULTZ DO         10/5/17


Nitrofurantoin Monohyd Macrocr* (Macrobid*) 100 Mg Capsr, 100 MG PO BID for 7 


Days, CAP


   Prov:RUSH MCNAMARA NP         7/12/17


Acetaminophen* (Tylophen*) 500 Mg Capsule, 1 CAP PO Q6H PRN for PAIN AND OR 


ELEVATED TEMP, #20 CAP


   Prov:SUSAN PLUMMERC         7/9/17


Cephalexin* (Keflex*) 500 Mg Capsule, 500 MG PO BID for 7 Days, CAP


   Prov:SUSAN PLUMMER-C         7/9/17


Phenazopyridine Hcl* (Pyridium*) 200 Mg Tab, 200 MG PO TID PRN for URINARY PAIN,


#6 TAB


   Prov:ROSITA HENDRICKSON PA-C         6/20/16


Nitrofurantoin Monohyd Macrocr* (Macrobid*) 100 Mg Capsr, 100 MG PO BID for 7 


Days, CAP


   Prov:ROSITA HENDRICKSON PA-C         6/20/16


Cyclobenzaprine Hcl* (Cyclobenzaprine Hcl*) 10 Mg Tablet, 10 MG PO TID, #15 TAB


   Prov:RUSH MCNAMARA NP         9/18/15


Ibuprofen* (Motrin*) 600 Mg Tab, 600 MG PO Q6H PRN for PAIN AND OR ELEVATED 


TEMP, #30


   Prov:RUSH MCNAMARA NP         9/18/15





Allergies


Allergies:  


Coded Allergies:  


     ciprofloxacin (Verified  Allergy, Intermediate, rashes, 7/24/19)


     morphine (Verified  Adverse Reaction, Unknown, DIARRHEA,VOMITING, 7/24/19)





PMhx/Soc


History of Surgery:  No


Anesthesia Reaction:  No


Hx Neurological Disorder:  No


Hx Respiratory Disorders:  No


Hx Cardiac Disorders:  No


Hx Psychiatric Problems:  No


Hx Miscellaneous Medical Probl:  No


Hx Alcohol Use:  No


Hx Substance Use:  No


Hx Tobacco Use:  No





FmHx


Family History:  No diabetes, No coronary disease





Physical Exam


Vitals





Vital Signs


  Date      Temp  Pulse  Resp  B/P (MAP)   Pulse Ox  O2          O2 Flow    FiO2


Time                                                 Delivery    Rate


   7/26/19  98.8     54    16      112/57        98


     15:41                           (75)





Physical Exam


Const:   No acute distress


Head:   Atraumatic 


Eyes:    Normal Conjunctiva


ENT:    Normal External Ears, Nose and Mouth.


Neck:               Full range of motion. No meningismus.


Resp:   Clear to auscultation bilaterally


Cardio:   Regular rate and rhythm, no murmurs


Abd:    Soft, non tender, non distended. Normal bowel sounds


Skin:   No petechiae or rashes


Back:   No midline or flank tenderness


Ext:    No cyanosis, or edema


Neur:   Awake and alert


Psych:    Normal Mood and Affect


Results 24 hrs





Current Medications


 Medications
   Dose
          Sig/Hair
       Start Time
   Status  Last


 (Trade)       Ordered        Route
 PRN     Stop Time              Admin
Dose


                              Reason                                Admin


 Lorazepam
     0.5 mg         ONCE  ONCE
    7/26/19                



(Ativan)                      PO
            16:30



                                             7/26/19 16:31








Procedures/MDM


Vital signs stable, Physical exam unremarkable, neurovascular exam intact.


Differential diagnosis include but not limited to: Depression, anxiety, 


migraine, thyroid disease, electrolyte imbalance.  Low suspicion for acute 


coronary event, aortic dissection, CVA.


Physical examination and clinical presentation consistent most likely with 


anxiety.


During the ED course the patient remained stable, no new complaints. The patient


received treatment with lorazepam presenting overall improvement of the 


symptoms.


Treatment options, results and clinical impression discussed with patient who 


agrees with management. The patient is stable to be treated outpatient and will 


be discharged home with a Rx for lorazepam, some side effects of prescribed 


medications (headache, rash, nausea, vomiting, diarrhea, drowsiness, 


habituation, bleeding, hypertension, interactions with other medications) were 


reviewed.





The patient was instructed to follow up with the primary care provider in the 


next 48h.  If symptoms persist, worsen or new symptoms develop, then patient 


should return to the ED immediately.





Instructions explained and given directly by me to the patient  with 


acknowledgment and demonstrated understanding.





Disclaimer: Inadvertent spelling and grammatical errors are likely due to 


EHR/dictation software use and do not reflect on the overall quality of patient 


care. Also, please note that the electronic time recorded on this note does not 


necessarily reflect the actual time of the patient encounter.





Departure


Diagnosis:  


   Primary Impression:  


   Anxiety


Condition:  Stable





Additional Instructions:  


Muchas branden por [facility] para jeronimo servicio.





Esperamos que en jeronimo visita a la aleida de emergencia jeronimo problema medico haya sido 


solucionado y que se sienta mucho mejor. 





Para estar seguros que jeronimo mejoria sigue en proceso, le pedimos el favor de hacer


nickolas ovidio de seguimiento medico con jeronimo doctor primario en los proximos 2-4 nunez.





Lleve con usted estos documentos y las medicinas recetadas.





Si delano sintomas empeoran, NO SE ESPERE, por favor regrese a aleida de emergencia 


INMEDIATAMENTE.





En yoly que usted no tenga un mdico de atencin primaria:


Llame al mdico o clnica comunitaria de referencia que aparece abajo sancho 


las horas de consultorio para hacer nickolas ovidio para que le vean.





CLINICAS:


[facility]  [phone] [address] [facility]  [phone] [address] [facility] [phone] [address] [facility]  [phone]


[address] [facility]   [phone] [address] [facility]. [phone] 


[address] EMETERIO BURT MD      Jul 26, 2019 16:08

## 2019-08-13 ENCOUNTER — HOSPITAL ENCOUNTER (EMERGENCY)
Dept: HOSPITAL 91 - FTE | Age: 59
Discharge: HOME | End: 2019-08-13
Payer: COMMERCIAL

## 2019-08-13 ENCOUNTER — HOSPITAL ENCOUNTER (EMERGENCY)
Dept: HOSPITAL 10 - FTE | Age: 59
Discharge: HOME | End: 2019-08-13
Payer: COMMERCIAL

## 2019-08-13 VITALS
BODY MASS INDEX: 18.88 KG/M2 | WEIGHT: 81.57 LBS | HEIGHT: 55 IN | HEIGHT: 55 IN | BODY MASS INDEX: 18.88 KG/M2 | WEIGHT: 81.57 LBS

## 2019-08-13 VITALS — DIASTOLIC BLOOD PRESSURE: 63 MMHG | HEART RATE: 87 BPM | RESPIRATION RATE: 17 BRPM | SYSTOLIC BLOOD PRESSURE: 111 MMHG

## 2019-08-13 DIAGNOSIS — N39.0: Primary | ICD-10-CM

## 2019-08-13 LAB
URINE BLOOD (DIP) POC: (no result)
URINE LEUKOCYTE EST (DIP) POC: (no result)
URINE PH (DIP) POC: 6 (ref 5–8.5)

## 2019-08-13 PROCEDURE — 87086 URINE CULTURE/COLONY COUNT: CPT

## 2019-08-13 PROCEDURE — 81003 URINALYSIS AUTO W/O SCOPE: CPT

## 2019-08-13 PROCEDURE — 99284 EMERGENCY DEPT VISIT MOD MDM: CPT

## 2019-08-13 PROCEDURE — 96372 THER/PROPH/DIAG INJ SC/IM: CPT

## 2019-08-13 RX ADMIN — HYDROCODONE BITARTRATE AND ACETAMINOPHEN 1 TAB: 5; 325 TABLET ORAL at 11:18

## 2019-08-13 RX ADMIN — CEFTRIAXONE SODIUM 1 GM: 1 INJECTION, POWDER, FOR SOLUTION INTRAMUSCULAR; INTRAVENOUS at 11:17

## 2019-08-13 RX ADMIN — LIDOCAINE HYDROCHLORIDE 1 ML: 10 INJECTION, SOLUTION EPIDURAL; INFILTRATION; INTRACAUDAL; PERINEURAL at 11:17

## 2019-08-13 NOTE — ERD
ER Documentation


Chief Complaint


Chief Complaint





PT BIB AMBULANCE FOR VAGINAL PAIN X3 DAYS, NO VB





HPI


59-year-old female presenting with dysuria x3 days.  Patient states she is had 


the same urinary tract infection for the last 17 years however she has not 


followed up with her gynecologist.  Patient has not seen a urologist.  Patient 


denies any fevers and has not taken medicine for her symptoms today.  She states


that she is experiencing increased dysuria over the last 3 days.  She denies any


fevers.  Denies back pain.  Denies vaginal discharge.  Denies other medical 


problems.  Allergic to morphine and Cipro.  Surgical history denies.





ROS


All systems reviewed and are negative except as per history of present illness.





Medications


Home Meds


Active Scripts


Phenazopyridine Hcl* (Pyridium*) 200 Mg Tab, 200 MG PO TID PRN for URINARY PAIN,


#6 TAB


   Prov:MARIO WATTS PA-C         8/13/19


Naproxen* (Naprosyn*) 500 Mg Tablet, 500 MG PO BID PRN for PAIN AND/OR 


INFLAMMATION, #30 TAB


   Prov:MARIO WATTS PA-C         8/13/19


Cephalexin* (Keflex*) 500 Mg Capsule, 500 MG PO QID for 7 Days, CAP


   Prov:MARIO WATTS PA-C         8/13/19


Lorazepam* (Ativan*) 0.5 Mg Tablet, 0.5 MG PO DAILY PRN for ANXIETY, #7 TAB


   Prov:EMETERIO HAYES MD         7/26/19


Diphenoxylate HCl/Atropine (Lomotil 2.5-0.025 mg Tablet) 1 Each Tablet, 1 TAB PO


QID PRN for DIARRHEA, #10 TAB


   Prov:ALONZO HANKS MD         7/24/19


Cephalexin* (Keflex*) 500 Mg Capsule, 500 MG PO Q8 for 10 Days, #30 CAP


   Prov:ALONZO HANKS MD         7/24/19


Phenazopyridine Hcl* (Pyridium*) 200 Mg Tab, 200 MG PO TID PRN for URINARY PAIN,


#6 TAB


   Prov:ODALIS ROWLAND PA-C         7/12/19


Nitrofurantoin Monohyd Macrocr* (Macrobid*) 100 Mg Capsr, 100 MG PO BID for 7 


Days, #14 CAP


   Prov:ODALIS ROWLAND PA-C         7/12/19


Acetaminophen* (Tylophen*) 500 Mg Capsule, 1 CAP PO Q6H PRN for PAIN AND OR 


ELEVATED TEMP, #20 CAP


   Prov:DURGA JACQUSE MD         4/5/19





Allergies


Allergies:  


Coded Allergies:  


     ciprofloxacin (Verified  Allergy, Intermediate, rashes, 7/24/19)


     morphine (Verified  Adverse Reaction, Unknown, DIARRHEA,VOMITING, 7/24/19)





PMhx/Soc


Medical and Surgical Hx:  pt denies Medical Hx


History of Surgery:  Yes (Yris)


Anesthesia Reaction:  No


Hx Neurological Disorder:  No


Hx Respiratory Disorders:  No


Hx Cardiac Disorders:  No


Hx Psychiatric Problems:  No


Hx Miscellaneous Medical Probl:  No


Hx Alcohol Use:  No


Hx Substance Use:  No


Hx Tobacco Use:  No


Smoking Status:  Never smoker





FmHx


Family History:  No diabetes, No coronary disease, No other





Physical Exam


Vitals





Vital Signs


  Date      Temp  Pulse  Resp  B/P (MAP)   Pulse Ox  O2          O2 Flow    FiO2


Time                                                 Delivery    Rate


   8/13/19  98.2     87    17      111/63        97  Room Air


     11:40                           (79)


   8/13/19  98.4     99    17      134/71        98


     10:15                           (92)





Physical Exam


GENERAL: The patient is well-appearing, well-nourished, in no acute distress


HEENT: Atraumatic.  Conjunctivae are pink.  Pupils equal, round, and reactive to


light.  There is no scleral icterus.  Tympanic membranes clear bilaterally.  


Oropharynx clear.  


CHEST: Clear to auscultation bilaterally.  There are no rales, wheezes or 


rhonchi.


HEART: Regular rate and rhythm.  No murmurs, clicks, rubs or gallops


ABDOMEN:Soft, nontender and nondistended.  Good bowel sounds.  No rebound or 


guarding.  No gross peritonitis.  No gross organomegaly or masses.  


BACK: No midline or flank tenderness.


Results 24 hrs





Laboratory Tests


               Test
                                8/13/19
10:43


               Bedside Urine pH (LAB)                        6.0


               Bedside Urine Protein (LAB)          Negative


               Bedside Urine Glucose (UA)           Negative


               Bedside Urine Ketones (LAB)          Negative


               Bedside Urine Blood                            2+


               Bedside Urine Nitrite (LAB)          Positive


               Bedside Urine Leukocyte
Esterase (L           2+ 






Current Medications


 Medications
   Dose
          Sig/Hair
       Start Time
   Status  Last


 (Trade)       Ordered        Route
 PRN     Stop Time              Admin
Dose


                              Reason                                Admin


 Ceftriaxone    1 gm           ONCE  ONCE
    8/13/19       DC           8/13/19


Sodium
                       IM
            11:00
                       11:17



(Rocephin)                                   8/13/19 11:01


 Lidocaine
     5 ml           ONCE  ONCE
    8/13/19       DC           8/13/19


(Xylocaine                    INJ
           11:00
                       11:17



1%
  (Mpf))                                  8/13/19 11:01


                1 tab          ONCE  ONCE
    8/13/19       DC           8/13/19


Acetaminophen                 PO
            11:30
                       11:18



/
                                           8/13/19 11:31


Hydrocodone


Bitart



([place]


(5/325))








Procedures/MDM


Course: Urinalysis positive for infection.  Rocephin given in ED.  Urine sent 


for culture.





MDM: 59-year-old female presenting with dysuria.  Patient has findings of 


urinary tract infection on urinalysis.  I have low suspicion for pyelonephritis.


 I have low suspicion for acute abdominal emergency.  Patient is discharged with


antibiotics.  Given that she has a long-standing urinary tract infection I will 


send urine for culture.  I have low suspicion for sepsis.  Will suspicion for 


acute abdominal or pelvic infection.  Patient is discharged with strict ER 


precautions and told to follow-up with primary care within 1 to 2 days for close


evaluation.  Patient is told symptoms change or worsen to return immediately to 


the ER.  All questions answered at discharge





Departure


Diagnosis:  


   Primary Impression:  


   UTI (urinary tract infection)


Condition:  Stable


Patient Instructions:  Understanding Urinary Tract Infections (UTIs)


Referrals:  


ALEX REYES (PCP)





Additional Instructions:  


FOLLOW UP WITH YOUR PRIMARY CARE PHYSICIAN TOMORROW.Return to this facility if 


you are not improving as expected.











MARIO WATTS PA-C       Aug 13, 2019 14:26

## 2019-08-15 ENCOUNTER — HOSPITAL ENCOUNTER (EMERGENCY)
Dept: HOSPITAL 91 - FTE | Age: 59
Discharge: HOME | End: 2019-08-15
Payer: COMMERCIAL

## 2019-08-15 ENCOUNTER — HOSPITAL ENCOUNTER (EMERGENCY)
Dept: HOSPITAL 10 - FTE | Age: 59
Discharge: HOME | End: 2019-08-15
Payer: COMMERCIAL

## 2019-08-15 VITALS
WEIGHT: 81.57 LBS | BODY MASS INDEX: 16.44 KG/M2 | HEIGHT: 59 IN | BODY MASS INDEX: 16.44 KG/M2 | HEIGHT: 59 IN | WEIGHT: 81.57 LBS

## 2019-08-15 VITALS — RESPIRATION RATE: 20 BRPM | DIASTOLIC BLOOD PRESSURE: 63 MMHG | HEART RATE: 85 BPM | SYSTOLIC BLOOD PRESSURE: 145 MMHG

## 2019-08-15 DIAGNOSIS — N30.90: Primary | ICD-10-CM

## 2019-08-15 LAB
ADD UMIC: YES
UR ASCORBIC ACID: NEGATIVE MG/DL
UR BILIRUBIN (DIP): NEGATIVE MG/DL
UR BLOOD (DIP): NEGATIVE MG/DL
UR CLARITY: CLEAR
UR COLOR: (no result)
UR GLUCOSE (DIP): NEGATIVE MG/DL
UR KETONES (DIP): NEGATIVE MG/DL
UR LEUKOCYTE ESTERASE (DIP): (no result) LEU/UL
UR NITRITE (DIP): POSITIVE MG/DL
UR PH (DIP): 8 (ref 5–9)
UR RBC: 2 /HPF (ref 0–5)
UR SPECIFIC GRAVITY (DIP): 1 (ref 1–1.03)
UR TOTAL PROTEIN (DIP): NEGATIVE MG/DL
UR UROBILINOGEN (DIP): NEGATIVE MG/DL
UR WBC: 1 /HPF (ref 0–5)

## 2019-08-15 PROCEDURE — 99284 EMERGENCY DEPT VISIT MOD MDM: CPT

## 2019-08-15 PROCEDURE — 81001 URINALYSIS AUTO W/SCOPE: CPT

## 2019-08-15 PROCEDURE — 87086 URINE CULTURE/COLONY COUNT: CPT

## 2019-08-15 RX ADMIN — ONDANSETRON 1 MG: 4 TABLET, ORALLY DISINTEGRATING ORAL at 12:00

## 2019-08-15 RX ADMIN — HYDROCODONE BITARTRATE AND ACETAMINOPHEN 1 TAB: 5; 325 TABLET ORAL at 12:00

## 2019-08-15 NOTE — ERD
ER Documentation


Chief Complaint


Chief Complaint





PAINFUL URINATION, VAGINAL PAIN





HPI


59-year-old female presenting with dysuria.  Patient states she has some vaginal


pain.  Patient is currently taking Keflex.  Patient denies any hematuria and 


denies any fevers.  Denies acute abdominal pain.  Medical history denies.  


Allergies to Cipro and morphine.  Surgical history denies.  Social history 


denies





ROS


All systems reviewed and are negative except as per history of present illness.





Medications


Home Meds


Active Scripts


Amoxicillin/Potassium Clav (Amox-Clav 875-125 mg Tablet) 875-125 mg Tab, 1 TAB 


PO BID for 7 Days, #14 TAB


   Prov:MARIO WATTS PA-C         8/15/19


Phenazopyridine Hcl* (Pyridium*) 200 Mg Tab, 200 MG PO TID PRN for URINARY PAIN,


#6 TAB


   Prov:MARIO WATTS PA-C         8/13/19


Naproxen* (Naprosyn*) 500 Mg Tablet, 500 MG PO BID PRN for PAIN AND/OR 


INFLAMMATION, #30 TAB


   Prov:MARIO WATTS PA-C         8/13/19


Cephalexin* (Keflex*) 500 Mg Capsule, 500 MG PO QID for 7 Days, CAP


   Prov:MARIO WATTS PA-C         8/13/19


Lorazepam* (Ativan*) 0.5 Mg Tablet, 0.5 MG PO DAILY PRN for ANXIETY, #7 TAB


   Prov:EMETERIO HAYES MD         7/26/19


Diphenoxylate HCl/Atropine (Lomotil 2.5-0.025 mg Tablet) 1 Each Tablet, 1 TAB PO


QID PRN for DIARRHEA, #10 TAB


   Prov:ALONZO HANKS MD         7/24/19


Cephalexin* (Keflex*) 500 Mg Capsule, 500 MG PO Q8 for 10 Days, #30 CAP


   Prov:ALONZO HANKS MD         7/24/19


Phenazopyridine Hcl* (Pyridium*) 200 Mg Tab, 200 MG PO TID PRN for URINARY PAIN,


#6 TAB


   Prov:ODALIS ROWLAND PA-C         7/12/19


Nitrofurantoin Monohyd Macrocr* (Macrobid*) 100 Mg Capsr, 100 MG PO BID for 7 


Days, #14 CAP


   Prov:ODALIS ROWLAND PA-C         7/12/19


Acetaminophen* (Tylophen*) 500 Mg Capsule, 1 CAP PO Q6H PRN for PAIN AND OR EL


EVATED TEMP, #20 CAP


   Prov:DURGA JACQUES MD         4/5/19





Allergies


Allergies:  


Coded Allergies:  


     ciprofloxacin (Verified  Allergy, Intermediate, rashes, 7/24/19)


     morphine (Verified  Adverse Reaction, Unknown, DIARRHEA,VOMITING, 7/24/19)





PMhx/Soc


History of Surgery:  Yes (Yris)


Anesthesia Reaction:  No


Hx Neurological Disorder:  No


Hx Respiratory Disorders:  No


Hx Cardiac Disorders:  No


Hx Psychiatric Problems:  No


Hx Miscellaneous Medical Probl:  Yes (recurrent UTI)


Hx Alcohol Use:  No


Hx Substance Use:  No


Hx Tobacco Use:  No


Smoking Status:  Never smoker





FmHx


Family History:  No diabetes, No coronary disease, No other





Physical Exam


Vitals





Vital Signs


  Date      Temp  Pulse  Resp  B/P (MAP)   Pulse Ox  O2          O2 Flow    FiO2


Time                                                 Delivery    Rate


   8/15/19  98.7     85    20      145/63        98


     10:58                           (90)





Physical Exam


GENERAL: The patient is well-appearing, well-nourished, in no acute distress


HEENT: Atraumatic.  Conjunctivae are pink.  Pupils equal, round, and reactive to


light.  There is no scleral icterus.  Tympanic membranes clear bilaterally.  


Oropharynx clear.  


CHEST: Clear to auscultation bilaterally.  There are no rales, wheezes or 


rhonchi.


HEART: Regular rate and rhythm.  No murmurs, clicks, rubs or gallops.  


ABDOMEN:Soft, nontender and nondistended.  Good bowel sounds.  No rebound or 


guarding.  No gross peritonitis.  No gross organomegaly or masses.  No Aguilar 


sign or McBurney point tenderness.


: Normal exam.  No masses or erythema noted around the vaginal opening.  No 


discharge.


Results 24 hrs





Laboratory Tests


                   Test
                       8/15/19
11:25


                   Urine Color               KAUR


                   Urine Clarity             CLEAR


                   Urine pH                             8.0


                   Urine Specific Gravity             1.001


                   Urine Ketones             NEGATIVE mg/dL


                   Urine Nitrite             POSITIVE mg/dL


                   Urine Bilirubin           NEGATIVE mg/dL


                   Urine Urobilinogen        NEGATIVE mg/dL


                   Urine Leukocyte Esterase       1+ Shan[place]/ul


                   Urine Microscopic RBC             2 /HPF


                   Urine Microscopic WBC             1 /HPF


                   Urine Hemoglobin          NEGATIVE mg/dL


                   Urine Glucose             NEGATIVE mg/dL


                   Urine Total Protein       NEGATIVE mg/dl





Current Medications


 Medications
   Dose
          Sig/Hair
       Start Time
   Status  Last


 (Trade)       Ordered        Route
 PRN     Stop Time              Admin
Dose


                              Reason                                Admin


                1 tab          ONCE  ONCE
    8/15/19       DC           8/15/19


Acetaminophen                 PO
            12:00
                       12:00



/
                                           8/15/19 12:01


Hydrocodone


Bitart



([place]


(5/325))


 Ondansetron    4 mg           ONCE  STAT
    8/15/19       DC           8/15/19


HCl
  (Zofran                 ODT
           11:55
                       12:00



Odt)                                         8/15/19 11:57








Procedures/MDM


ER course: Ucon given in ED.  Urinalysis positive for infection. 





MDM: 59-year-old female presenting with dysuria.  Patient has a positive 


infection.  Patient has a urine culture pending at this time.  I will add on 


Augmentin given patient has been taking Keflex.  Patient's  exam is within 


normal limits.  I have low suspicion for pyelonephritis or other acute abdominal


emergency.  Patient is discharged with strict ER precautions and told to follow-


up with primary care.  All questions answered at discharge





Departure


Diagnosis:  


   Primary Impression:  


   Cystitis


Condition:  Stable


Patient Instructions:  Cystitis


Referrals:  


ALEX REYES (PCP)








OB/GYN REFERRAL LIST


MENDY CRAIG MD


[address] [facility] [address] [place], [place] [id number] [phone] OFFICE FAX [phone]





DR.ABUSLEME ROSALINA


[address] [place], [place] [id number] [phone]





[facility]


[address] [place], [place] [id number] [phone]





RAMAKRISHNA HORN


[address] [facility] [address] [address]


[phone]





AGUILA MARQUEZ


[address] [place], [place] [id number] [phone]





[facility]


[address] [place] [address]


[phone] [address] [facility] [address] [phone]  [phone]  [phone]





DR DEVAN ALCALA


[address], [place] [address]


[phone]





DR JIMÉNEZ, NAMITA


[address] [facility] [address], [place] [address]


[phone]





DR BIGGS, LUCINDAID


[address] [place] [address] [phone]





Additional Instructions:  


FOLLOW UP WITH YOUR PRIMARY CARE PHYSICIAN TOMORROW.Return to this facility if 


you are not improving as expected.











MARIO WATTS PA-C       Aug 15, 2019 12:49

## 2019-09-16 ENCOUNTER — HOSPITAL ENCOUNTER (EMERGENCY)
Dept: HOSPITAL 91 - FTE | Age: 59
Discharge: HOME | End: 2019-09-16
Payer: COMMERCIAL

## 2019-09-16 ENCOUNTER — HOSPITAL ENCOUNTER (EMERGENCY)
Dept: HOSPITAL 10 - FTE | Age: 59
Discharge: HOME | End: 2019-09-16
Payer: COMMERCIAL

## 2019-09-16 VITALS
BODY MASS INDEX: 16.66 KG/M2 | BODY MASS INDEX: 16.66 KG/M2 | WEIGHT: 79.37 LBS | HEIGHT: 58 IN | WEIGHT: 79.37 LBS | HEIGHT: 58 IN

## 2019-09-16 VITALS — DIASTOLIC BLOOD PRESSURE: 55 MMHG | RESPIRATION RATE: 18 BRPM | SYSTOLIC BLOOD PRESSURE: 107 MMHG | HEART RATE: 87 BPM

## 2019-09-16 DIAGNOSIS — N30.00: ICD-10-CM

## 2019-09-16 DIAGNOSIS — F33.0: Primary | ICD-10-CM

## 2019-09-16 LAB
ADD UMIC: YES
UR AMORPHOUS CRYSTAL: (no result) /HPF
UR ASCORBIC ACID: NEGATIVE MG/DL
UR BACTERIA: (no result) /HPF
UR BILIRUBIN (DIP): NEGATIVE MG/DL
UR BLOOD (DIP): (no result) MG/DL
UR CLARITY: (no result)
UR COLOR: YELLOW
UR GLUCOSE (DIP): NEGATIVE MG/DL
UR KETONES (DIP): NEGATIVE MG/DL
UR LEUKOCYTE ESTERASE (DIP): (no result) LEU/UL
UR NITRITE (DIP): NEGATIVE MG/DL
UR PH (DIP): 6 (ref 5–9)
UR RBC: 13 /HPF (ref 0–5)
UR SPECIFIC GRAVITY (DIP): 1 (ref 1–1.03)
UR SQUAMOUS EPITHELIAL CELL: (no result) /HPF
UR TOTAL PROTEIN (DIP): NEGATIVE MG/DL
UR UROBILINOGEN (DIP): NEGATIVE MG/DL
UR WBC: 36 /HPF (ref 0–5)

## 2019-09-16 PROCEDURE — 81001 URINALYSIS AUTO W/SCOPE: CPT

## 2019-09-16 PROCEDURE — 99283 EMERGENCY DEPT VISIT LOW MDM: CPT

## 2019-11-06 ENCOUNTER — HOSPITAL ENCOUNTER (EMERGENCY)
Dept: HOSPITAL 10 - E/R | Age: 59
LOS: 1 days | Discharge: HOME | End: 2019-11-07
Payer: COMMERCIAL

## 2019-11-06 VITALS
WEIGHT: 77.6 LBS | BODY MASS INDEX: 17.46 KG/M2 | BODY MASS INDEX: 17.46 KG/M2 | HEIGHT: 56 IN | HEIGHT: 56 IN | WEIGHT: 77.6 LBS

## 2019-11-06 DIAGNOSIS — K59.00: Primary | ICD-10-CM

## 2019-11-06 PROCEDURE — 96374 THER/PROPH/DIAG INJ IV PUSH: CPT

## 2019-11-06 PROCEDURE — 80053 COMPREHEN METABOLIC PANEL: CPT

## 2019-11-06 PROCEDURE — 96361 HYDRATE IV INFUSION ADD-ON: CPT

## 2019-11-06 PROCEDURE — 81001 URINALYSIS AUTO W/SCOPE: CPT

## 2019-11-06 PROCEDURE — 83690 ASSAY OF LIPASE: CPT

## 2019-11-06 PROCEDURE — 85025 COMPLETE CBC W/AUTO DIFF WBC: CPT

## 2019-11-07 VITALS — RESPIRATION RATE: 17 BRPM | DIASTOLIC BLOOD PRESSURE: 67 MMHG | SYSTOLIC BLOOD PRESSURE: 121 MMHG | HEART RATE: 69 BPM
